# Patient Record
Sex: FEMALE | Race: WHITE | Employment: PART TIME | URBAN - METROPOLITAN AREA
[De-identification: names, ages, dates, MRNs, and addresses within clinical notes are randomized per-mention and may not be internally consistent; named-entity substitution may affect disease eponyms.]

---

## 2017-03-09 ENCOUNTER — HOSPITAL ENCOUNTER (OUTPATIENT)
Dept: GENERAL RADIOLOGY | Age: 52
Discharge: HOME | End: 2017-03-09
Attending: PHYSICAL MEDICINE & REHABILITATION
Payer: COMMERCIAL

## 2017-03-09 ENCOUNTER — HOSPITAL ENCOUNTER (OUTPATIENT)
Dept: MRI IMAGING | Age: 52
Discharge: HOME | End: 2017-03-09
Attending: PHYSICAL MEDICINE & REHABILITATION
Payer: COMMERCIAL

## 2017-03-09 DIAGNOSIS — M54.16 LUMBAR RADICULOPATHY: ICD-10-CM

## 2017-03-09 DIAGNOSIS — M54.17 LUMBOSACRAL RADICULITIS: ICD-10-CM

## 2017-03-09 DIAGNOSIS — M54.17 LUMBOSACRAL RADICULOPATHY: ICD-10-CM

## 2017-03-09 PROCEDURE — 72100 X-RAY EXAM L-S SPINE 2/3 VWS: CPT

## 2017-03-09 PROCEDURE — 72148 MRI LUMBAR SPINE W/O DYE: CPT

## 2017-03-14 ENCOUNTER — HOSPITAL ENCOUNTER (OUTPATIENT)
Dept: PHYSICAL THERAPY | Age: 52
Discharge: HOME | End: 2017-03-14
Payer: COMMERCIAL

## 2017-03-14 PROCEDURE — 97110 THERAPEUTIC EXERCISES: CPT | Performed by: PHYSICAL THERAPIST

## 2017-03-14 PROCEDURE — 97161 PT EVAL LOW COMPLEX 20 MIN: CPT | Performed by: PHYSICAL THERAPIST

## 2017-03-14 NOTE — PROGRESS NOTES
[facility]  [address]  [place], [address]  Phone: [phone]  Fax: [phone]    Plan of Care/Statement of Necessity for Physical Therapy Services  2-15    Patient name: Isaias Broderick  : 1965  Provider#: [id number]  Referral source: Liberty Pettit MD      Medical/Treatment Diagnosis: Low back pain [M54.5]     Prior Hospitalization: see medical history     Comorbidities: see evaluation  Prior Level of Function: see evaluation  Medications: Verified on Patient Summary List    Start of Care: 3/13/17      Onset Date: L sided L5/S1 laminectomy        The Plan of Care and following information is based on the information from the initial evaluation. Assessment/ key information: Pt is a 45year old female with presenting with signs and symptoms consistent with R>L lumbar radiculopathy. MRI + L5/S1 mild broad-based disc protrusion/osteophyte and minimal right foraminal stenosis at L5-S1. PMH significant for left L5/S1 laminectomy in .       Evaluation Complexity History MEDIUM  Complexity : 1-2 comorbidities / personal factors will impact the outcome/ POC ; Examination LOW Complexity : 1-2 Standardized tests and measures addressing body structure, function, activity limitation and / or participation in recreation  ;Presentation LOW Complexity : Stable, uncomplicated  ;Clinical Decision Making MEDIUM Complexity : FOTO score of 26-74  Overall Complexity Rating: LOW     Problem List: pain affecting function, decrease ROM, decrease strength, impaired gait/ balance, decrease ADL/ functional abilitiies, decrease activity tolerance, decrease flexibility/ joint mobility and decrease transfer abilities   Treatment Plan may include any combination of the following: Therapeutic exercise, Therapeutic activities, Neuromuscular re-education, Physical agent/modality, Gait/balance training, Manual therapy, Patient education, Self Care training, Functional mobility training, Home safety training and Stair training  Patient / Family readiness to learn indicated by: asking questions, trying to perform skills and interest  Persons(s) to be included in education: patient (P)  Barriers to Learning/Limitations: None  Patient Goal (s): see evaluation  Patient Self Reported Health Status: good  Rehabilitation Potential: good    Short Term Goals: To be accomplished in 2-3 weeks:  1) Pt will be independent in initial HEP  2) Pt will report 25% decrease in exacerbation of symptoms  3) Pt will report use of ice on a daily basis in order to decrease pain/inflammation. Long Term Goals: To be accomplished in 6-8 weeks:  1) Pt will increase B hip abd strength to at least 4+/5 with no more than 2/10 pain in order to perform yardwork. 2) Pt will report being able to sit for at least 45 min with no more than 2/10 pain in order to perform work duties  3) Pt will report at least 75% decrease in radicular symptoms and low back pain in order to play tennis. Frequency / Duration: Patient to be seen 1-2 times per week for 6-8 weeks. Patient/ Caregiver education and instruction: self care, activity modification and exercises    [x]  Plan of care has been reviewed with DEVIN Ventura, PT 3/14/2017 12:49 PM    ________________________________________________________________________    I certify that the above Therapy Services are being furnished while the patient is under my care. I agree with the treatment plan and certify that this therapy is necessary.     [de-identified] Signature:____________________  Date:____________Time: _________

## 2017-03-14 NOTE — PROGRESS NOTES
[facility]  [address], [place], [address]  Phone: [phone]  Fax: [phone]    PT INITIAL EVALUATION NOTE - [facility] 2-15    Patient Name: Karol Pelaez  Date:3/14/2017  : 1965  [x]  Patient  Verified   Payor: Raudel Moreno / Plan: Lamine Clark PPO / Product Type: PPO /    In time:1:00 PM  Out time:2:00 PM  Total Treatment Time (min): 60  Total Timed Codes (min): 15  1:1 Treatment Time ([facility] only): --   Visit #: 1     Treatment Area: Low back pain [M54.5]    SUBJECTIVE    Any medication changes, allergies to medications, adverse drug reactions, diagnosis change, or new procedure performed?: [] No    [x] Yes (see summary sheet for update)    Current symptoms/chief complaint:    Pt presents with LBP since prior to left sided L5/S1 laminectomy in . She had PT prior to her surgery. 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 \" She continued to have pain after surgery- \"from a bulging disc. \" She had an injection in  and also went through another round of PT. She reports pain off and on since then- \"I would just deal with the pain. It would usually go away after a day or so. But since December it has been constant. \" She reports starting to play 3 hrs of tennis on Tuesday nights since November. She states that her pain sometimes radiates into her R buttock/hip/anterior thigh. Tramadol is helpful- only takes it as needed. She sometimes gets awoken at night from the pain. MRI on 3/3/17- \"I haven't gotten the results back. \" Per CC- L5/S1- There is a mild broad-based disc protrusion/osteophyte. Facet degenerative change is minimal. Discogenic marrow degenerative changes. Schmorl's node along the inferior endplate of L5. Hemilaminotomy on the left. The canal is widely patent. The Minimal right foraminal stenosis. .    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 She used to be a nurse. Prior level of function: Pt is active- plays tennis, housework, yardwork however does it all with pain- \"I know I'll pay for it later\"    Patient goal(s): \"to become pain-free. To do my everyday activities without pain\"     Objective:      Posture:   Kyphosis: [] Increased [] Decreased   [x]  WNL  Lordosis:  [] Increased [] Decreased   [x] WNL     Gait:   Description: No significant findings    Observations:  Pt with inc'd UE chair support when transferring sit --> stand and dec'd weight shift forward    Active Movements:  ROM  AROM Comments:pain, area   Forward flexion  25% Inc'd R buttock pain   Extension  75% No change in pain   Seated Rotation right NT  -   Seated Rotation left NT  -   SB right Superior to patella Inc'd R buttock pain   SB left Superior to patella Inc'd R buttock pain (worse than SB right)     Strength:      L(0-5) R (0-5) Comments   Hip Flexion (L1,2) 4+ 4 -   Knee Extension (L3,4) 4 4 -   Knee Flexion (S1,2) 4 4+ -   Ankle Dorsiflexion (L4) 5 5 -   Sidelying hip abduction 4- 4- -   SLR 4- 4- -     Heel walking: 10 ft, no pain  Toe walking: 10 ft, no pain  Unilateral heel raise x5:  100% each LE. \"I can really feel it\" on R. Single limb stance: 10 seconds each LE. Inc'd ankle/hip strategy on L.    Prone hip extension: Pt unable to perform 1 rep on R- \"It hurts and feels so weak\"    Neurosensory:  Sensation intact    Palpation:    TTP L5 spinous process; R QL  No TTP R piriformis    Dural Mobility:  SLR Supine: [] R    [] L    [] +    [x] -    Crossed SLR  [] R    [] L    [] +    [x] -    Slump Test: [x] R    [] L    [x] +    [] -     Pain in R buttock region with slumped posture, and cranial overpressure    Stabilization Tests  ASLR Test:   [] Pos  [x] Neg   Prone Instability Test  [x] Pos  [] Neg     Flexibility Deficits:  HS tight bilaterally    Joint mobility:  Pain with sacral PA mob         Outcome Measure: FOTO not completed at this time; score to be reported at next visit.      15 min Therapeutic Exercise:  [x] See flow sheet : PPT, PPT with march, quad alt UE's, clamshells   Rationale: increase ROM and increase strength to improve the patients ability to play tennis    10 min, ice lumbar  Supine, LE's elevated          With   [x] TE   [] TA   [] neuro   [] other: Patient Education: [x] Review HEP    [] Progressed/Changed HEP based on:   [x] positioning   [x] body mechanics   [x] transfers   [x] heat/ice application    [x] other: reviewed log-roll technique; rimma/phys of spine and reason for symptoms; use of pillow under legs while sleeping; reviewed body mechanics of vacuuming/tennis/lifting; importance of ice on a daily basis      Pain Level (0-10 scale) post treatment: Not reported    Assessment:   [x] See POC  [] Other:  Plan:   [x] See POC  [] Other  [] Discharge due to:     Andrew Choi PT, DPT     3/14/2017     12:49 PM

## 2017-03-23 ENCOUNTER — HOSPITAL ENCOUNTER (OUTPATIENT)
Dept: PHYSICAL THERAPY | Age: 52
Discharge: HOME | End: 2017-03-23
Payer: COMMERCIAL

## 2017-03-23 PROCEDURE — 97110 THERAPEUTIC EXERCISES: CPT | Performed by: PHYSICAL THERAPIST

## 2017-03-23 NOTE — PROGRESS NOTES
PT DAILY TREATMENT NOTE 2-15    Patient Name: Adela Orellana  Date:3/23/2017  : 1965  [x]  Patient  Verified  Payor: Jelly oGddard / Plan: Shanice Fields PPO / Product Type: PPO /    In time:9:30 AM  Out time: 10:35 AM  Total Treatment Time (min): 65 (55 timed)  Visit #: 2     Treatment Area: Low back pain [M54.5]    SUBJECTIVE  Pain Level (0-10 scale): 0/10 \"except when I move it a certain way\"  Any medication changes, allergies to medications, adverse drug reactions, diagnosis change, or new procedure performed?: [x] No    [] Yes (see summary sheet for update)  Subjective functional status/changes:   [] No changes reported  Pt states she is getting an injection tomorrow.  She has been feeling the same since - \"although it didn't hurt as bad playing tennis on Tuesday\"    OBJECTIVE    Modality rationale: decrease inflammation and decrease pain to improve the patients ability to perform housework   Min Type Additional Details    [] Estim: []Att   []Unatt        []TENS instruct                  []IFC  []Premod   []NMES                     []Other:  []w/US   []w/ice   []w/heat  Position:  Location:    []  Traction: [] Cervical       []Lumbar                       [] Prone          []Supine                       []Intermittent   []Continuous Lbs:  [] before manual  [] after manual  []w/heat    []  Ultrasound: []Continuous   [] Pulsed at:                            []1MHz   []3MHz Location:  W/cm2:    []  Paraffin         Location:  []w/heat   10 [x]  Ice     []  Heat  []  Ice massage Position: supine, LE's elevated  Location: lumbar     []  Laser  []  Other: Position:  Location:    []  Vasopneumatic Device Pressure:       [] lo [] med [] hi   Temperature:    [x] Skin assessment post-treatment:  [x]intact []redness- no adverse reaction    []redness  adverse reaction:     55 min Therapeutic Exercise:  [x] See flow sheet :    Rationale: increase ROM, increase strength and improve coordination to improve the patients ability to play tennis      min Neuromuscular Re-education:  -  See flow sheet :   Rationale:     min Manual Therapy:     Rationale            With   [] TE   [] TA   [] neuro   [] other: Patient Education: [x] Review HEP    [] Progressed/Changed HEP based on:   [] positioning   [] body mechanics   [] transfers   [] heat/ice application    [] other:      Other Objective/Functional Measures:   FOTO= 61/100     Pain Level (0-10 scale) post treatment: 0/10    ASSESSMENT/Changes in Function:     Pt challenged with lateral stepping with tband and sidestepping with band at ankles. Pt req'd cues for maintaining pelvic tilt during marching and supine knee extension. Overall tolerated session well without exacerbation of back pain. Reviewed performing housework without performing trunk forward flexion in order to reduce radicular symptoms. Patient will continue to benefit from skilled PT services to modify and progress therapeutic interventions, address functional mobility deficits, address ROM deficits, address strength deficits, analyze and address soft tissue restrictions, analyze and cue movement patterns and analyze and modify body mechanics/ergonomics to attain remaining goals.      [x]  See Plan of Care  []  See progress note/recertification  []  See Discharge Summary         Progress towards goals / Updated goals:  nt    PLAN  []  Upgrade activities as tolerated     [x]  Continue plan of care  []  Update interventions per flow sheet       []  Discharge due to:_  []  Other:_      Samreen John PT 3/23/2017  9:39 AM

## 2017-03-27 ENCOUNTER — HOSPITAL ENCOUNTER (OUTPATIENT)
Dept: PHYSICAL THERAPY | Age: 52
Discharge: HOME | End: 2017-03-27
Payer: COMMERCIAL

## 2017-03-27 PROCEDURE — 97140 MANUAL THERAPY 1/> REGIONS: CPT | Performed by: PHYSICAL THERAPIST

## 2017-03-27 PROCEDURE — 97110 THERAPEUTIC EXERCISES: CPT | Performed by: PHYSICAL THERAPIST

## 2017-03-27 NOTE — PROGRESS NOTES
PT DAILY TREATMENT NOTE 2-15    Patient Name: Brenna Gay  Date:3/27/2017  : 1965  [x]  Patient  Verified  Payor: Nas Anderson / Plan: Adria Crane PPO / Product Type: PPO /    In time: 10:30 AM  Out time: 11:40 AM  Total Treatment Time (min): 70 (60 timed)  Visit #: 3    Treatment Area: Low back pain [M54.5]    SUBJECTIVE  Pain Level (0-10 scale): 0/10  Any medication changes, allergies to medications, adverse drug reactions, diagnosis change, or new procedure performed?: [x] No    [] Yes (see summary sheet for update)  Subjective functional status/changes:   [] No changes reported  Pt reports she feels better after the injection- \"I'm still feeling it a little bit\"    OBJECTIVE    Modality rationale: decrease inflammation and decrease pain to improve the patients ability to perform housework   Min Type Additional Details    [] Estim: []Att   []Unatt        []TENS instruct                  []IFC  []Premod   []NMES                     []Other:  []w/US   []w/ice   []w/heat  Position:  Location:    []  Traction: [] Cervical       []Lumbar                       [] Prone          []Supine                       []Intermittent   []Continuous Lbs:  [] before manual  [] after manual  []w/heat    []  Ultrasound: []Continuous   [] Pulsed at:                            []1MHz   []3MHz Location:  W/cm2:    []  Paraffin         Location:  []w/heat   10 [x]  Ice     []  Heat  []  Ice massage Position: supine, LE's elevated  Location: lumbar     []  Laser  []  Other: Position:  Location:    []  Vasopneumatic Device Pressure:       [] lo [] med [] hi   Temperature:    [x] Skin assessment post-treatment:  [x]intact []redness- no adverse reaction    []redness  adverse reaction:     50 min Therapeutic Exercise:  [x] See flow sheet :    Rationale: increase ROM, increase strength and improve coordination to improve the patients ability to play tennis      min Neuromuscular Re-education:  -  See flow sheet :   Rationale:    10 min Manual Therapy: STM/TPR R piriformis, QL     Rationale: increase tissue extensibility, decrease pain to improve pt's ability to perform ADL's            With   [] TE   [] TA   [] neuro   [] other: Patient Education: [x] Review HEP    [] Progressed/Changed HEP based on:   [] positioning   [] body mechanics   [] transfers   [] heat/ice application    [] other:      Other Objective/Functional Measures: Mod to severe TTP R piriformis, QL, R ITB  ASIS symmetrical in supine     Pain Level (0-10 scale) post treatment: 0/10    ASSESSMENT/Changes in Function:     Muscle sequencing/activation corrected for PPT. Pt reported slight relief of \"tightness\" after ITB foam roll. Educated pt on continued use of ice at home due to manual today. Updated HEP to include piriformis stretch, lateral tband walkouts, sidestepping with tband. Patient will continue to benefit from skilled PT services to modify and progress therapeutic interventions, address functional mobility deficits, address ROM deficits, address strength deficits, analyze and address soft tissue restrictions, analyze and cue movement patterns and analyze and modify body mechanics/ergonomics to attain remaining goals.      [x]  See Plan of Care  []  See progress note/recertification  []  See Discharge Summary         Progress towards goals / Updated goals:  nt    PLAN  []  Upgrade activities as tolerated     [x]  Continue plan of care  []  Update interventions per flow sheet       []  Discharge due to:_  []  Other:_      Samreen John PT 3/27/2017  10:35 AM

## 2017-03-29 ENCOUNTER — APPOINTMENT (OUTPATIENT)
Dept: PHYSICAL THERAPY | Age: 52
End: 2017-03-29
Payer: COMMERCIAL

## 2017-03-30 ENCOUNTER — APPOINTMENT (OUTPATIENT)
Dept: PHYSICAL THERAPY | Age: 52
End: 2017-03-30
Payer: COMMERCIAL

## 2017-04-03 ENCOUNTER — HOSPITAL ENCOUNTER (OUTPATIENT)
Dept: PHYSICAL THERAPY | Age: 52
Discharge: HOME | End: 2017-04-03
Payer: COMMERCIAL

## 2017-04-03 PROCEDURE — 97110 THERAPEUTIC EXERCISES: CPT | Performed by: PHYSICAL THERAPIST

## 2017-04-03 PROCEDURE — 97014 ELECTRIC STIMULATION THERAPY: CPT | Performed by: PHYSICAL THERAPIST

## 2017-04-03 PROCEDURE — 97140 MANUAL THERAPY 1/> REGIONS: CPT | Performed by: PHYSICAL THERAPIST

## 2017-04-03 NOTE — PROGRESS NOTES
PT DAILY TREATMENT NOTE 2-15    Patient Name: Scottie Xavier  Date:4/3/2017  : 1965  [x]  Patient  Verified  Payor: Joseph Crawford / Plan: Bear Hernandez PPO / Product Type: PPO /    In time: 9:30 AM  Out time: 10:40 AM  Total Treatment Time (min): 70 (60 timed)  Visit #: 4    Treatment Area: Low back pain [M54.5]    SUBJECTIVE  Pain Level (0-10 scale): 2-3/10  Any medication changes, allergies to medications, adverse drug reactions, diagnosis change, or new procedure performed?: [x] No    [] Yes (see summary sheet for update)  Subjective functional status/changes:   [] No changes reported  Pt reports she visited her mother in [place] this weekend- 6 hours in car on Friday and 6 hours on . She also did 50 squats on Friday night. She reports she has inc'd hip, and central LBP since last visit. She also reports pain in R elbow and shoulder while riding in the car.  \"I was in a lot of pain when I woke up on Saturday morning\"   She is getting a second injection on Friday this week    OBJECTIVE    Modality rationale: decrease inflammation and decrease pain to improve the patients ability to perform housework   Min Type Additional Details   10 [x] Estim: []Att   []Unatt        []TENS instruct                  [x]IFC  []Premod   []NMES                     []Other:  []w/US   [x]w/ice   []w/heat  Position: R lumbar  Location:    []  Traction: [] Cervical       []Lumbar                       [] Prone          []Supine                       []Intermittent   []Continuous Lbs:  [] before manual  [] after manual  []w/heat    []  Ultrasound: []Continuous   [] Pulsed at:                            []1MHz   []3MHz Location:  W/cm2:    []  Paraffin         Location:  []w/heat    [x]  Ice     []  Heat  []  Ice massage Position: supine, LE's elevated  Location: lumbar     []  Laser  []  Other: Position:  Location:    []  Vasopneumatic Device Pressure:       [] lo [] med [] hi   Temperature:    [x] Skin assessment post-treatment:  [x]intact []redness- no adverse reaction    []redness  adverse reaction:     50 min Therapeutic Exercise:  [x] See flow sheet :    Rationale: increase ROM, increase strength and improve coordination to improve the patients ability to play tennis      min Neuromuscular Re-education:  -  See flow sheet :   Rationale:    10 min Manual Therapy: STM/TPR R piriformis, sacral ligaments, QL   MET to correct R anteriorly rotated ilium    Rationale: increase tissue extensibility, decrease pain to improve pt's ability to perform ADL's            With   [] TE   [] TA   [] neuro   [] other: Patient Education: [x] Review HEP    [] Progressed/Changed HEP based on:   [] positioning   [] body mechanics   [] transfers   [] heat/ice application    [] other:      Other Objective/Functional Measures:   R ASIS anteriorly rotated compared to L in supine   B squat x3- quad dominant squat; anterior weight shift    Pain Level (0-10 scale) post treatment: 0/10 \"numb\"    ASSESSMENT/Changes in Function:     Held on sidestepping with tband, bridges, and foam rolling today due to increased symptoms. Educated pt on proper form during squats and with use of UE support for activation of glutes vs quads. Performed MET to correct anteriorly rotated R ilium. Patient will continue to benefit from skilled PT services to modify and progress therapeutic interventions, address functional mobility deficits, address ROM deficits, address strength deficits, analyze and address soft tissue restrictions, analyze and cue movement patterns and analyze and modify body mechanics/ergonomics to attain remaining goals.      [x]  See Plan of Care  []  See progress note/recertification  []  See Discharge Summary         Progress towards goals / Updated goals:  nt    PLAN  []  Upgrade activities as tolerated     [x]  Continue plan of care  []  Update interventions per flow sheet       []  Discharge due to:_  []  Other:_      Tavia Going, PT 4/3/2017  9:28 AM

## 2017-04-17 ENCOUNTER — APPOINTMENT (OUTPATIENT)
Dept: PHYSICAL THERAPY | Age: 52
End: 2017-04-17
Payer: COMMERCIAL

## 2017-04-21 ENCOUNTER — HOSPITAL ENCOUNTER (OUTPATIENT)
Dept: PHYSICAL THERAPY | Age: 52
Discharge: HOME | End: 2017-04-21
Payer: COMMERCIAL

## 2017-04-21 PROCEDURE — 97110 THERAPEUTIC EXERCISES: CPT | Performed by: PHYSICAL THERAPIST

## 2017-04-21 PROCEDURE — 97140 MANUAL THERAPY 1/> REGIONS: CPT | Performed by: PHYSICAL THERAPIST

## 2017-04-21 NOTE — PROGRESS NOTES
PT DAILY TREATMENT NOTE 2-15    Patient Name: Anais Vincent  Date:2017  : 1965  [x]  Patient  Verified  Payor: Cameron Rosas / Plan: Adolph Goodell PPO / Product Type: PPO /    In time: 9:00 AM  Out time: 10:25 AM  Total Treatment Time (min): 85 (70 timed)  Visit #: 5    Treatment Area: Low back pain [M54.5]    SUBJECTIVE  Pain Level (0-10 scale): 1/10  Any medication changes, allergies to medications, adverse drug reactions, diagnosis change, or new procedure performed?: [x] No    [] Yes (see summary sheet for update)  Subjective functional status/changes:   [] No changes reported  Pt states \"I had the second injection two weeks ago. I think between that and the therapy its really been helping. \" She had to go back up to [place] last week- her mother is not doing well.      OBJECTIVE    Modality rationale: decrease inflammation and decrease pain to improve the patients ability to perform housework   Min Type Additional Details    [] Estim: []Att   []Unatt        []TENS instruct                  [x]IFC  []Premod   []NMES                     []Other:  []w/US   [x]w/ice   []w/heat  Position: R lumbar  Location:    []  Traction: [] Cervical       []Lumbar                       [] Prone          []Supine                       []Intermittent   []Continuous Lbs:  [] before manual  [] after manual  []w/heat    []  Ultrasound: []Continuous   [] Pulsed at:                            []1MHz   []3MHz Location:  W/cm2:    []  Paraffin         Location:  []w/heat   10 [x]  Ice     []  Heat  []  Ice massage Position: supine, LE's elevated  Location: lumbar     []  Laser  []  Other: Position:  Location:    []  Vasopneumatic Device Pressure:       [] lo [] med [] hi   Temperature:    [x] Skin assessment post-treatment:  [x]intact []redness- no adverse reaction    []redness  adverse reaction:     60 min Therapeutic Exercise:  [x] See flow sheet :    Rationale: increase ROM, increase strength and improve coordination to improve the patients ability to play tennis      min Neuromuscular Re-education:  -  See flow sheet :   Rationale:    15 min Manual Therapy: STM/TPR R piriformis, ITB   Rationale: increase tissue extensibility, decrease pain to improve pt's ability to perform ADL's            With   [] TE   [] TA   [] neuro   [] other: Patient Education: [x] Review HEP    [] Progressed/Changed HEP based on:   [] positioning   [] body mechanics   [] transfers   [] heat/ice application    [] other:      Other Objective/Functional Measures:   Severe TTP over R ITB    Pain Level (0-10 scale) post treatment: 0/10    ASSESSMENT/Changes in Function:     Added supine ITB stretch with strap due to severe TTP over area- advised to ice region this evening due to possible soreness after manual today. Overall tolerated therapy session well. Pt progressing with strengthening and proprioceptive training    Patient will continue to benefit from skilled PT services to modify and progress therapeutic interventions, address functional mobility deficits, address ROM deficits, address strength deficits, analyze and address soft tissue restrictions, analyze and cue movement patterns and analyze and modify body mechanics/ergonomics to attain remaining goals.      [x]  See Plan of Care  []  See progress note/recertification  []  See Discharge Summary         Progress towards goals / Updated goals:  nt    PLAN  []  Upgrade activities as tolerated     [x]  Continue plan of care  []  Update interventions per flow sheet       []  Discharge due to:_  []  Other:_      Con Ortez, PT 4/21/2017  9:23 AM

## 2017-04-24 ENCOUNTER — APPOINTMENT (OUTPATIENT)
Dept: PHYSICAL THERAPY | Age: 52
End: 2017-04-24
Payer: COMMERCIAL

## 2017-04-26 ENCOUNTER — HOSPITAL ENCOUNTER (OUTPATIENT)
Dept: PHYSICAL THERAPY | Age: 52
Discharge: HOME | End: 2017-04-26
Payer: COMMERCIAL

## 2017-04-26 PROCEDURE — 97110 THERAPEUTIC EXERCISES: CPT | Performed by: PHYSICAL THERAPIST

## 2017-04-26 PROCEDURE — 97140 MANUAL THERAPY 1/> REGIONS: CPT | Performed by: PHYSICAL THERAPIST

## 2017-04-26 NOTE — PROGRESS NOTES
PT DAILY TREATMENT NOTE 2-15    Patient Name: Mallorie Alvarez  Date:2017  : 1965  [x]  Patient  Verified  Payor: Ashanti Ours / Plan: Emily Quiroz PPO / Product Type: PPO /    In time: 12:30 PM  Out time: 1:30 PM  Total Treatment Time (min): 60 (60 timed)  Visit #: 6    Treatment Area: Low back pain [M54.5]    SUBJECTIVE  Pain Level (0-10 scale): \"sore\"  Any medication changes, allergies to medications, adverse drug reactions, diagnosis change, or new procedure performed?: [x] No    [] Yes (see summary sheet for update)  Subjective functional status/changes:   [] No changes reported  Pt states \"soreness\" today, she had some pain down the outside of her leg after last visit.  She has to leave by 1:30 today for work    OBJECTIVE    Modality rationale: decrease inflammation and decrease pain to improve the patients ability to perform housework   Min Type Additional Details    [] Estim: []Att   []Unatt        []TENS instruct                  [x]IFC  []Premod   []NMES                     []Other:  []w/US   [x]w/ice   []w/heat  Position: R lumbar  Location:    []  Traction: [] Cervical       []Lumbar                       [] Prone          []Supine                       []Intermittent   []Continuous Lbs:  [] before manual  [] after manual  []w/heat    []  Ultrasound: []Continuous   [] Pulsed at:                            []1MHz   []3MHz Location:  W/cm2:    []  Paraffin         Location:  []w/heat   At home [x]  Ice     []  Heat  []  Ice massage Position: supine, LE's elevated  Location: lumbar     []  Laser  []  Other: Position:  Location:    []  Vasopneumatic Device Pressure:       [] lo [] med [] hi   Temperature:    [x] Skin assessment post-treatment:  [x]intact []redness- no adverse reaction    []redness  adverse reaction:     45 min Therapeutic Exercise:  [x] See flow sheet :    Rationale: increase ROM, increase strength and improve coordination to improve the patients ability to play tennis      min Neuromuscular Re-education:  -  See flow sheet :   Rationale:    15 min Manual Therapy: STM/TPR R piriformis, ITB   Rationale: increase tissue extensibility, decrease pain to improve pt's ability to perform ADL's            With   [] TE   [] TA   [] neuro   [] other: Patient Education: [x] Review HEP    [] Progressed/Changed HEP based on:   [] positioning   [] body mechanics   [] transfers   [] heat/ice application    [] other:      Other Objective/Functional Measures:   Severe TTP over R ITB  ASIS, medial malleoli symmetrical in supine    Pain Level (0-10 scale) post treatment: 0/10    ASSESSMENT/Changes in Function:     Encouraged pt to continue stretching program and using ice. She tolerated therapy session well, progressed several core strengthening exercises. Patient will continue to benefit from skilled PT services to modify and progress therapeutic interventions, address functional mobility deficits, address ROM deficits, address strength deficits, analyze and address soft tissue restrictions, analyze and cue movement patterns and analyze and modify body mechanics/ergonomics to attain remaining goals.      [x]  See Plan of Care  []  See progress note/recertification  []  See Discharge Summary         Progress towards goals / Updated goals:  nt    PLAN  []  Upgrade activities as tolerated     [x]  Continue plan of care  []  Update interventions per flow sheet       []  Discharge due to:_  []  Other:_      Kailey Enriquez, PT 4/26/2017  12:40 PM

## 2017-05-02 ENCOUNTER — APPOINTMENT (OUTPATIENT)
Dept: PHYSICAL THERAPY | Age: 52
End: 2017-05-02

## 2018-03-01 ENCOUNTER — HOSPITAL ENCOUNTER (OUTPATIENT)
Dept: GENERAL RADIOLOGY | Age: 53
Discharge: HOME | End: 2018-03-01
Payer: COMMERCIAL

## 2018-03-01 DIAGNOSIS — M25.50 JOINT PAIN: ICD-10-CM

## 2018-03-01 PROCEDURE — 73130 X-RAY EXAM OF HAND: CPT

## 2018-03-01 PROCEDURE — 73630 X-RAY EXAM OF FOOT: CPT

## 2018-03-01 PROCEDURE — 73610 X-RAY EXAM OF ANKLE: CPT

## 2018-05-25 ENCOUNTER — OFFICE VISIT (OUTPATIENT)
Dept: INTERNAL MEDICINE CLINIC | Age: 53
End: 2018-05-25

## 2018-05-25 VITALS
BODY MASS INDEX: 24.14 KG/M2 | RESPIRATION RATE: 15 BRPM | SYSTOLIC BLOOD PRESSURE: 110 MMHG | WEIGHT: 144.9 LBS | DIASTOLIC BLOOD PRESSURE: 70 MMHG | HEART RATE: 80 BPM | HEIGHT: 65 IN | TEMPERATURE: 98.3 F | OXYGEN SATURATION: 98 %

## 2018-05-25 DIAGNOSIS — M62.830 BACK SPASM: ICD-10-CM

## 2018-05-25 DIAGNOSIS — M35.9 CONNECTIVE TISSUE DISEASE (HCC): Primary | ICD-10-CM

## 2018-05-25 RX ORDER — BISMUTH SUBSALICYLATE 262 MG
1 TABLET,CHEWABLE ORAL DAILY
COMMUNITY

## 2018-05-25 RX ORDER — CHOLECALCIFEROL (VITAMIN D3) 125 MCG
CAPSULE ORAL
COMMUNITY

## 2018-05-25 RX ORDER — DICLOFENAC SODIUM 75 MG/1
TABLET, DELAYED RELEASE ORAL
Refills: 0 | COMMUNITY
Start: 2018-04-19 | End: 2018-11-20

## 2018-05-25 NOTE — PROGRESS NOTES
Establish Care (New patient, here to establish care.)       HPI:  Satish Galvin is a 47y.o. year old female who is here to establish care. She  had her medical care:    Dr. Bucky Donald at [facility]    She reports the following history and medical concerns: Mother passed away from lung cancer.  (smoker)  Brother diagnosed with pancreatic cancer. 2010- Raynaud's - BEBO positive. \"some type of CTD\"  2 years ago developed joint pain. Mixed CTD. Dr. Nick Figueredo at [facility]. Increased LFT- fatty liver on ultrasound 2016  Last blood test a month ago. Did a tox screen for tramadol with rheumatologist.  Tramadol helps with her joint pain but she wants to come off it. Assessment and Plan        1. Connective tissue disease ([address]  Will need to get old records. She states she never had an elevated CRP. Her joint pain is mostly in am and there is no swelling. Her back issues may be related to problem #2 and she may not need the plaquenil. She would like to see Dr. Ollie Cheney again. MRI reviewed that showed some disc disease that a [facility] doctor wanted to do surgery- but she saw someone at [facility] that felt this can be managed without surgery. xrays of hands, and ankles show no abnormalities. .  xr spine negative.    - REFERRAL TO RHEUMATOLOGY    2. Back spasm  Spasm palpated on right lower back. Tender to touch.   TIME OUT performed immediately prior to start of procedure:   James Velazquez MD, have performed the following reviews on Satish Galvin   prior to the start of the procedure:     * Patient was identified by name and date of birth   * Agreement on procedure being performed was verified   * Risks and Benefits explained to the patient   * Procedure site verified and marked as necessary   * Patient was positioned for comfort   * Consent was given by patient    Date of procedure: 5/25/2018  Procedure performed by:Finn Simmons MD   Patient assisted by: self   How tolerated by patient: tolerated the procedure well with no complications   Comments: none         Patient explained the risks and benefits of acupuncture to help with the acute problem. There can be some soreness afterwards and the effect can be immediate to slightly delayed (2-3 days). If the problem persists or gets worse, please call back or send a my chart message. If you experience shortness of breath, please let me know immediately. Patient agreed to proceed with treatment. Seirin packaged needle used No. 5 (0.25) 30 mm  QTY 1  Points palpated and inserted 5-10 mm at marked points  Patient tolerated procedure and felt unknown relief as she was not feeling pain now. Use tiger balm to area  Stretches  Warm epsom salt baths    Turmeric to help with joint pain. Anti-inflammatory foods. Probiotic to change microbiome. Visit Vitals    /70 (BP 1 Location: Left arm, BP Patient Position: Sitting)    Pulse 80    Temp 98.3 °F (36.8 °C) (Oral)    Resp 15    Ht 5' 5\" (1.651 m)    Wt 144 lb 14.4 oz (65.7 kg)    SpO2 98%    BMI 24.11 kg/m2       Historical Data    Past Medical History:   Diagnosis Date    Auto[facility] disease ([facility]     Esophageal stricture     Menopause        Past Surgical History:   Procedure Laterality Date    HX APPENDECTOMY      HX HYSTEROSCOPY WITH ENDOMETRIAL ABLATION         Outpatient Encounter Prescriptions as of 5/25/2018   Medication Sig Dispense Refill    diclofenac EC (VOLTAREN) 75 mg EC tablet TAKE 1 TABLET WITH FOOD OR MILK TWICE A DAY AS NEEDED ORALLY 30 DAY(S)  0    multivitamin (ONE A DAY) tablet Take 1 Tab by mouth daily.  ferrous fumarate/vit Bcomp,C (SUPER B COMPLEX PO) Take  by mouth.  melatonin tab tablet Take  by mouth nightly.  hydroxychloroquine (PLAQUENIL) 200 mg tablet Take 200 mg by mouth two (2) times a day.  fexofenadine (ALLEGRA) 180 mg tablet Take 180 mg by mouth daily as needed.  Indications: SEASONAL ALLERGIC RHINITIS      predniSONE (Graydon Pollo) 10 mg tablet Prednisone 10mg tabs:  6 tabs daily for 2 days then drop to   4 tabs daily for 2 days then drop to   2 tabs daily for 2 days then drop to   1 tab daily for 2 days then stop. Dispense 26 tabs 26 Tab 0    traMADol (ULTRAM) 50 mg tablet Take 50 mg by mouth every six (6) hours as needed for Pain.  estradiol (VAGIFEM) 10 mcg tab vaginal tablet Insert 10 mcg into vagina every Monday, Wednesday, Friday.  estradiol (ESTRACE) 0.01 % (0.1 mg/gram) vaginal cream Insert 2 g into vagina every Monday, Wednesday, Friday. No facility-administered encounter medications on file as of 5/25/2018. Allergies   Allergen Reactions    Sulindac Hives, Shortness of Breath, Palpitations and Other (comments)    Talwin [Pentazocine Lactate] Hives        Social History     Social History    Marital status:      Spouse name: N/A    Number of children: N/A    Years of education: N/A     Occupational History    Not on file. Social History Main Topics    Smoking status: Never Smoker    Smokeless tobacco: Never Used    Alcohol use No    Drug use: No    Sexual activity: Not on file     Other Topics Concern    Not on file     Social History Narrative        family history includes Lung Cancer in her mother; Pancreatic Cancer in her brother. Review of Systems   Constitutional: Negative for weight loss. Eyes: Negative for blurred vision. Respiratory: Negative for shortness of breath. Cardiovascular: Negative for chest pain. Gastrointestinal: Negative for abdominal pain. Genitourinary: Negative for dysuria and frequency. Musculoskeletal: Positive for back pain and joint pain. Negative for myalgias and neck pain. Skin: Negative for rash. Neurological: Negative for dizziness, focal weakness, weakness and headaches. Endo/Heme/Allergies: Negative for environmental allergies. Does not bruise/bleed easily.          Physical Exam   Constitutional: She is oriented to person, place, and time. She appears well-nourished. No distress. Neck: Carotid bruit is not present. No thyromegaly present. Cardiovascular: Normal rate, regular rhythm and normal heart sounds. Pulmonary/Chest: Effort normal and breath sounds normal. No respiratory distress. She has no wheezes. Abdominal: Soft. Bowel sounds are normal. She exhibits no mass. There is no tenderness. Musculoskeletal: She exhibits no edema. Lumbar back: She exhibits tenderness, pain and spasm. She exhibits no bony tenderness, no edema and normal pulse. Back:    Lymphadenopathy:     She has no cervical adenopathy. Neurological: She is alert and oriented to person, place, and time. Skin: Skin is warm and dry. No rash noted. No erythema. Psychiatric: She has a normal mood and affect. Thought content normal.   Nursing note and vitals reviewed. Ortho Exam       Orders Placed This Encounter    diclofenac EC (VOLTAREN) 75 mg EC tablet     Sig: TAKE 1 TABLET WITH FOOD OR MILK TWICE A DAY AS NEEDED ORALLY 30 DAY(S)     Refill:  0    multivitamin (ONE A DAY) tablet     Sig: Take 1 Tab by mouth daily.  ferrous fumarate/vit Bcomp,C (SUPER B COMPLEX PO)     Sig: Take  by mouth.  melatonin tab tablet     Sig: Take  by mouth nightly. I have reviewed the patient's medical history in detail and updated the computerized patient record. We had a prolonged discussion about these complex clinical issues and went over the various important aspects to consider. All questions were answered. Advised her to call back or return to office if symptoms do not improve, change in nature, or persist.    She was given an after visit summary or informed of [email or web address] which includes patient instructions, diagnoses, current medications, & vitals. She expressed understanding with the diagnosis and plan.

## 2018-05-25 NOTE — MR AVS SNAPSHOT
[address] [facility], [address] [place] [address] 
[phone] Patient: Francesco Vaughan MRN: [id number] [id number] Visit Information Date & Time Provider Department Dept. Phone Encounter #  
 5/25/2018 11:15 AM Jasbir Kwon MD [place] [id number] [phone] Upcoming Health Maintenance Date Due Hepatitis C Screening 1965 DTaP/Tdap/Td series (1 - Tdap) 6/3/1985 PAP AKA CERVICAL CYTOLOGY 6/3/1985 BREAST CANCER SCRN MAMMOGRAM 6/3/2014 FOBT Q 1 YEAR AGE 50-75 6/3/2014 Influenza Age 5 to Adult 8/1/2018 Allergies as of 5/25/2018  Review Complete On: 5/25/2018 By: Jasbir Kwon MD  
  
 Severity Noted Reaction Type Reactions Sulindac High 03/26/2015   Systemic Hives, Shortness of Breath, Palpitations, Other (comments) Talwin [Pentazocine Lactate]  03/25/2015    Hives Current Immunizations  Never Reviewed Name Date Influenza Vaccine PF 3/26/2015  9:22 AM  
 Pneumococcal Polysaccharide (PPSV-23) 3/26/2015  9:24 AM  
  
 Not reviewed this visit You Were Diagnosed With   
  
 Codes Comments Connective tissue disease ([facility]    -  Primary ICD-10-CM: M35.9 ICD-9-CM: 710.9 Vitals BP Pulse Temp Resp Height(growth percentile) Weight(growth percentile) 110/70 (BP 1 Location: Left arm, BP Patient Position: Sitting) 80 98.3 °F (36.8 °C) (Oral) 15 5' 5\" (1.651 m) 144 lb 14.4 oz (65.7 kg) SpO2 BMI OB Status Smoking Status 98% 24.11 kg/m2 Menopause Never Smoker Vitals History BMI and BSA Data Body Mass Index Body Surface Area  
 24.11 kg/m 2 1.74 m 2 Preferred Pharmacy Pharmacy Name Phone [id number] [phone] Your Updated Medication List  
  
   
This list is accurate as of 5/25/18 12:15 PM.  Always use your most recent med list. ALLEGRA 180 mg tablet Generic drug:  fexofenadine Take 180 mg by mouth daily as needed. Indications: SEASONAL ALLERGIC RHINITIS  
  
 diclofenac EC 75 mg EC tablet Commonly known as:  VOLTAREN  
TAKE 1 TABLET WITH FOOD OR MILK TWICE A DAY AS NEEDED ORALLY 30 DAY(S)  
  
 hydroxychloroquine 200 mg tablet Commonly known as:  PLAQUENIL Take 200 mg by mouth two (2) times a day. melatonin Tab tablet Take  by mouth nightly. multivitamin tablet Commonly known as:  ONE A DAY Take 1 Tab by mouth daily. traMADol 50 mg tablet Commonly known as:  ULTRAM  
Take 50 mg by mouth every six (6) hours as needed for Pain. We Performed the Following REFERRAL TO RHEUMATOLOGY [IIS21 Custom] Comments:  
 Elizabeth [facility] Adriana Almeida Referral Information Referral ID Referred By Referred To  
  
 [id number] Bertha Johnson MD   
   [address] [place] [address] Phone: [phone] Fax: [phone] Visits Status Start Date End Date 1 New Request 5/25/18 5/25/19 If your referral has a status of pending review or denied, additional information will be sent to support the outcome of this decision. Patient Instructions Turmeric [facility] HEALTHY SWEETS How much: Sparingly Healthy choices: Unsweetened dried fruit, dark chocolate, fruit sorbet Why: Dark chocolate provides polyphenols with antioxidant activity. Choose dark chocolate with at least 70 percent pure cocoa and have an ounce a few times a week. Fruit sorbet is a better option than other frozen desserts. RED WINE How much: Optional, no more than 1-2 glasses per day Healthy choices: Organic red wine Why: Red wine has beneficial antioxidant activity. Limit intake to no more than 1-2 servings per day. If you do not drink alcohol, do not start. SUPPLEMENTS How much: Daily Healthy choices: High quality multivitamin/multimineral that includes key antioxidants (vitamin C, vitamin E, mixed carotenoids, and selenium); co-enzyme Q10; 2-3 grams of a molecularly distilled fish oil; 2,000 IU of vitamin D3 Why: Supplements help fill any gaps in your diet when you are unable to get your daily requirement of micronutrients. Click here to learn more about supplements and get your free recommendation. TEA How much: 2-4 cups per day Healthy choices: White, green, oolong teas Why: Tea is rich in catechins, antioxidant compounds that reduce inflammation. Purchase high-quality tea and learn how to correctly brew it for maximum taste and health benefits. [facility] [address] How much: Unlimited amounts Healthy choices: Turmeric, dunham powder (which contains turmeric), sweta and garlic (dried and fresh), chili peppers, basil, cinnamon, rosemary, thyme Why: Use these herbs and spices generously to season foods. Turmeric and sweta are powerful, natural anti-inflammatory agents. OTHER SOURCES OF PROTEIN How much: 1-2 servings a week (one portion is equal to 1 ounce of cheese, 1 eight-ounce serving of dairy, 1 egg, 3 ounces cooked poultry or skinless meat) Healthy choices: High quality natural cheese and yogurt, omega-3 enriched eggs, skinless poultry, grass-fed lean meats Why: In general, try to reduce consumption of animal foods. If you eat chicken, choose organic, cage-free chicken and remove the skin and associated fat. Use organic, reduced-fat dairy products moderately, especially yogurt and natural cheeses such as [facility] (Swiss), [place] and true ROSS. If you eat eggs, choose omega-3 enriched eggs (made by feeding hens a flax-meal-enriched diet), or organic eggs from free-range chickens. [facility] How much: Unlimited amounts Healthy choices: Shiitake, enokidake, maitake, oyster mushrooms (and wild mushrooms if available) Why: These mushrooms contain compounds that enhance immune function.  Never eat mushrooms raw, and minimize consumption of common commercial button mushrooms (including crimini and portobello). WHOLE SOY FOODS How much: 1-2 servings per day (one serving is equal to ½ cup tofu or tempeh, 1 cup soymilk, ½ cup cooked edamame, 1 ounce of soynuts) Healthy choices: Tofu, tempeh, edamame, soy nuts, soymilk Why: Soy foods contain isoflavones that have antioxidant activity and are protective against cancer. Choose whole soy foods over fractionated foods like isolated soy protein powders and imitation meats made with soy isolate. FISH & SEAFOOD How much:  2-6 servings per week (one serving is equal to 4 ounces of fish or seafood) Healthy choices: Wild [place] salmon (especially sockeye), herring, sardines, and black cod (sablefish) Why: These fish are rich in omega-3 fats, which are strongly anti-inflammatory. If you choose not to eat fish, take a molecularly distilled fish oil supplement that provides both EPA and DHA in a dose of 2-3 grams per day. HEALTHY FATS How much:  5-7 servings per day (one serving is equal to 1 teaspoon of oil, 2 walnuts, 1 tablespoon of flaxseed, 1 ounce of avocado) Healthy choices: For cooking, use extra virgin olive oil and expeller-pressed organic canola oil. Other sources of healthy fats include nuts (especially walnuts), avocados, and seeds - including hemp seeds and freshly ground flaxseed. Omega-3 fats are also found in cold water fish, omega-3 enriched eggs, and whole soy foods. Organic, expeller pressed, high-oleic sunflower or safflower oils may also be used, as well as walnut and hazelnut oils in salads and dark roasted sesame oil as a flavoring for soups and stir-fries Why: Healthy fats are those rich in either monounsaturated or omega-3 fats. Extra-virgin olive oil is rich in polyphenols with antioxidant activity and canola oil contains a small fraction of omega-3 fatty acids. WHOLE & CRACKED GRAINS How much:  3-5 servings a day (one serving is equal to about ½ cup cooked grains) Healthy choices: Brown rice, basmati rice, wild rice, buckwheat, groats, barley, quinoa, steel-cut oats Why: Whole grains digest slowly, reducing frequency of spikes in blood sugar that promote inflammation. \"Whole grains\" means grains that are intact or in a few large pieces, not whole wheat bread or other products made from flour. PASTA (al dente) How much: 2-3 servings per week (one serving is equal to about ½ cup cooked pasta) Healthy choices: Organic pasta, rice noodles, bean thread noodles, and part whole wheat and buckwheat noodles like [place] udon and soba Why: Pasta cooked al dente (when it has \"tooth\" to it) has a lower glycemic index than fully-cooked pasta. Low-glycemic-load carbohydrates should be the bulk of your carbohydrate intake to help minimize spikes in blood glucose levels. BEANS & LEGUMES How much: 1-2 servings per day (one serving is equal to ½ cup cooked beans or legumes) Healthy choices: Beans like Anasazi, adzuki and black, as well as chickpeas, black-eyed peas and lentils Why: Beans are rich in folic acid, magnesium, potassium and soluble fiber. They are a low-glycemic-load food. Eat them well-cooked either whole or pureed into spreads like hummus. VEGETABLES How much: 4-5 servings per day minimum (one serving is equal to 2 cups salad greens, ½ cup vegetables cooked, raw or juiced) Healthy Choices: Lightly cooked dark leafy greens (spinach, henry greens, kale, Swiss chard), cruciferous vegetables (broccoli, cabbage, [place] sprouts, kale, bok rickey and cauliflower), carrots, beets, onions, peas, squashes, sea vegetables and washed raw salad greens Why: Vegetables are rich in flavonoids and carotenoids with both antioxidant and anti-inflammatory activity. Go for a wide range of colors, eat them both raw and cooked, and choose organic when possible. FRUITS How much:  3-4 servings per day (one serving is equal to 1 medium size piece of fruit, ½ cup chopped fruit, ¼ cup of dried fruit) Healthy choices: Raspberries, blueberries, strawberries, peaches, nectarines, oranges, pink grapefruit, red grapes, plums, pomegranates, blackberries, cherries, apples, and pears - all lower in glycemic load than most tropical fruits Why: Fruits are rich in flavonoids and carotenoids with both antioxidant and anti-inflammatory activity. Go for a wide range of colors, choose fruit that is fresh in season or frozen, and buy organic when possible. Additional Item: 
WATER How much: Throughout the day Healthy choices: Drink pure water, or drinks that are mostly water (tea, very diluted fruit juice, sparkling water with lemon) throughout the day. Why: Water is vital for overall functioning of the body. Microbiome. - gut bacteria Dr. Tyrone Ley [employer] probiotics for Women Stop dairy and wheat for 2 weeks at least. 
 
 
 
 
 
 
 
 
 
 
 
 
  
Introducing [facility]! Dear Delores Antunez: Thank you for requesting a [employer] account. Our records indicate that you already have an active [employer] account. You can access your account anytime at [email or web address] Did you know that you can access your hospital and ER discharge instructions at any time in [employer]? You can also review all of your test results from your hospital stay or ER visit. Additional Information If you have questions, please visit the Frequently Asked Questions section of the [employer] website at [email or web address] Remember, [employer] is NOT to be used for urgent needs. For medical emergencies, dial 911. Now available from your iPhone and Android! Please provide this summary of care documentation to your next provider. Your primary care clinician is listed as Kamla Velazquez  If you have any questions after today's visit, please call [phone].

## 2018-05-25 NOTE — PATIENT INSTRUCTIONS
Turmeric    Araceli Manual Milk          HEALTHY SWEETS  How much: Sparingly  Healthy choices: Unsweetened dried fruit, dark chocolate, fruit sorbet  Why: Dark chocolate provides polyphenols with antioxidant activity. Choose dark chocolate with at least 70 percent pure cocoa and have an ounce a few times a week. Fruit sorbet is a better option than other frozen desserts. RED WINE  How much: Optional, no more than 1-2 glasses per day  Healthy choices: Organic red wine   Why: Red wine has beneficial antioxidant activity. Limit intake to no more than 1-2 servings per day. If you do not drink alcohol, do not start. SUPPLEMENTS  How much: Daily   Healthy choices: High quality multivitamin/multimineral that includes key antioxidants (vitamin C, vitamin E, mixed carotenoids, and selenium); co-enzyme Q10; 2-3 grams of a molecularly distilled fish oil; 2,000 IU of vitamin D3   Why: Supplements help fill any gaps in your diet when you are unable to get your daily requirement of micronutrients. Click here to learn more about supplements and get your free recommendation. TEA  How much: 2-4 cups per day  Healthy choices: White, green, oolong teas  Why: Tea is rich in catechins, antioxidant compounds that reduce inflammation. Purchase high-quality tea and learn how to correctly brew it for maximum taste and health benefits. HEALTHY HERBS & SPICES  How much: Unlimited amounts  Healthy choices: Turmeric, dunham powder (which contains turmeric), sweta and garlic (dried and fresh), chili peppers, basil, cinnamon, rosemary, thyme  Why: Use these herbs and spices generously to season foods. Turmeric and sweta are powerful, natural anti-inflammatory agents.   OTHER SOURCES OF PROTEIN  How much: 1-2 servings a week (one portion is equal to 1 ounce of cheese, 1 eight-ounce serving of dairy, 1 egg, 3 ounces cooked poultry or skinless meat)  Healthy choices: High quality natural cheese and yogurt, omega-3 enriched eggs, skinless poultry, grass-fed lean meats  Why: In general, try to reduce consumption of animal foods. If you eat chicken, choose organic, cage-free chicken and remove the skin and associated fat. Use organic, reduced-fat dairy products moderately, especially yogurt and natural cheeses such as Emmental (Swiss), [place] and true ROSS. If you eat eggs, choose omega-3 enriched eggs (made by feeding hens a flax-meal-enriched diet), or organic eggs from free-range chickens. COOKED  MUSHROOMS  How much: Unlimited amounts  Healthy choices: Shiitake, enokidake, maitake, oyster mushrooms (and wild mushrooms if available)   Why: These mushrooms contain compounds that enhance immune function. Never eat mushrooms raw, and minimize consumption of common commercial button mushrooms (including crimini and portobello). WHOLE SOY FOODS  How much: 1-2 servings per day (one serving is equal to ½ cup tofu or tempeh, 1 cup soymilk, ½ cup cooked edamame, 1 ounce of soynuts)  Healthy choices: Tofu, tempeh, edamame, soy nuts, soymilk  Why: Soy foods contain isoflavones that have antioxidant activity and are protective against cancer. Choose whole soy foods over fractionated foods like isolated soy protein powders and imitation meats made with soy isolate. FISH & SEAFOOD  How much:  2-6 servings per week (one serving is equal to 4 ounces of fish or seafood)  Healthy choices: Wild [place] salmon (especially sockeye), herring, sardines, and black cod (sablefish)  Why: These fish are rich in omega-3 fats, which are strongly anti-inflammatory. If you choose not to eat fish, take a molecularly distilled fish oil supplement that provides both EPA and DHA in a dose of 2-3 grams per day. HEALTHY FATS  How much:  5-7 servings per day (one serving is equal to 1 teaspoon of oil, 2 walnuts, 1 tablespoon of flaxseed, 1 ounce of avocado)   Healthy choices: For cooking, use extra virgin olive oil and expeller-pressed organic canola oil.  Other sources of healthy fats include nuts (especially walnuts), avocados, and seeds - including hemp seeds and freshly ground flaxseed. Omega-3 fats are also found in cold water fish, omega-3 enriched eggs, and whole soy foods. Organic, expeller pressed, high-oleic sunflower or safflower oils may also be used, as well as walnut and hazelnut oils in salads and dark roasted sesame oil as a flavoring for soups and stir-fries  Why: Healthy fats are those rich in either monounsaturated or omega-3 fats. Extra-virgin olive oil is rich in polyphenols with antioxidant activity and canola oil contains a small fraction of omega-3 fatty acids. WHOLE & CRACKED GRAINS  How much:  3-5 servings a day (one serving is equal to about ½ cup cooked grains)  Healthy choices: [place], basmati rice, wild rice, buckwheat, groats, barley, quinoa, steel-cut oats   Why: Whole grains digest slowly, reducing frequency of spikes in blood sugar that promote inflammation. \"Whole grains\" means grains that are intact or in a few large pieces, not whole wheat bread or other products made from flour. PASTA (al dente)  How much: 2-3 servings per week (one serving is equal to about ½ cup cooked pasta)  Healthy choices: Organic pasta, rice noodles, bean thread noodles, and part whole wheat and buckwheat noodles like Japanese udon and soba  Why: Pasta cooked al dente (when it has \"tooth\" to it) has a lower glycemic index than fully-cooked pasta. Low-glycemic-load carbohydrates should be the bulk of your carbohydrate intake to help minimize spikes in blood glucose levels. BEANS & LEGUMES  How much: 1-2 servings per day (one serving is equal to ½ cup cooked beans or legumes)  Healthy choices: Beans like [place]asazi, adzuki and black, as well as chickpeas, black-eyed peas and lentils  Why: Beans are rich in folic acid, magnesium, potassium and soluble fiber. They are a low-glycemic-load food.   Eat them well-cooked either whole or pureed into spreads like hummus. VEGETABLES  How much: 4-5 servings per day minimum (one serving is equal to 2 cups salad greens, ½ cup vegetables cooked, raw or juiced)  Healthy Choices: Lightly cooked dark leafy greens (spinach, henry greens, kale, Swiss chard), cruciferous vegetables (broccoli, cabbage, [place] sprouts, kale, bok rickey and cauliflower), carrots, beets, onions, peas, squashes, sea vegetables and washed raw salad greens  Why: Vegetables are rich in flavonoids and carotenoids with both antioxidant and anti-inflammatory activity. Go for a wide range of colors, eat them both raw and cooked, and choose organic when possible. FRUITS  How much:  3-4 servings per day (one serving is equal to 1 medium size piece of fruit, ½ cup chopped fruit, ¼ cup of dried fruit)  Healthy choices: Raspberries, blueberries, strawberries, peaches, nectarines, oranges, pink grapefruit, red grapes, plums, pomegranates, blackberries, cherries, apples, and pears - all lower in glycemic load than most tropical fruits  Why: Fruits are rich in flavonoids and carotenoids with both antioxidant and anti-inflammatory activity. Go for a wide range of colors, choose fruit that is fresh in season or frozen, and buy organic when possible. Additional Item:  WATER  How much: Throughout the day  Healthy choices: Drink pure water, or drinks that are mostly water (tea, very diluted fruit juice, sparkling water with lemon) throughout the day. Why: Water is vital for overall functioning of the body.         Microbiome. - gut bacteria      Dr. Bonita Webb [facility]    Stop dairy and wheat for 2 weeks at least.

## 2018-05-25 NOTE — PROGRESS NOTES
Chief Complaint   Patient presents with   [facility] patient, here to establish care. 1. Have you been to the ER, urgent care clinic since your last visit? Hospitalized since your last visit? No    2. Have you seen or consulted any other health care providers outside of the [address] [place] [address] since your last visit? Include any pap smears or colon screening.  No     Health Maintenance Due   Topic Date Due    Hepatitis C Screening  1965    DTaP/Tdap/Td series (1 - Tdap) 06/03/1985    PAP AKA CERVICAL CYTOLOGY  06/03/1985    BREAST CANCER SCRN MAMMOGRAM  06/03/2014    FOBT Q 1 YEAR AGE 50-75  06/03/2014

## 2018-08-21 ENCOUNTER — OFFICE VISIT (OUTPATIENT)
Dept: INTERNAL MEDICINE CLINIC | Age: 53
End: 2018-08-21

## 2018-08-21 VITALS
WEIGHT: 142.6 LBS | HEART RATE: 86 BPM | DIASTOLIC BLOOD PRESSURE: 80 MMHG | BODY MASS INDEX: 23.76 KG/M2 | TEMPERATURE: 98.8 F | SYSTOLIC BLOOD PRESSURE: 100 MMHG | RESPIRATION RATE: 18 BRPM | HEIGHT: 65 IN | OXYGEN SATURATION: 98 %

## 2018-08-21 DIAGNOSIS — R79.89 ELEVATED LFTS: ICD-10-CM

## 2018-08-21 DIAGNOSIS — M35.9 CONNECTIVE TISSUE DISEASE (HCC): ICD-10-CM

## 2018-08-21 DIAGNOSIS — Z00.00 ROUTINE GENERAL MEDICAL EXAMINATION AT A HEALTH CARE FACILITY: Primary | ICD-10-CM

## 2018-08-21 DIAGNOSIS — M62.830 BACK SPASM: ICD-10-CM

## 2018-08-21 NOTE — PROGRESS NOTES
HPI:  Brenna Gay is a 46y.o. year old female who is here for an annual physical:  LAST SEEN: 5/25/2018     Wt Readings from Last 3 Encounters:   08/21/18 142 lb 9.6 oz (64.7 kg)   05/25/18 144 lb 14.4 oz (65.7 kg)   03/26/15 158 lb 1.1 oz (71.7 kg)     Temp Readings from Last 3 Encounters:   08/21/18 98.8 °F (37.1 °C) (Oral)   05/25/18 98.3 °F (36.8 °C) (Oral)   03/26/15 98.4 °F (36.9 °C)     BP Readings from Last 3 Encounters:   08/21/18 100/80   05/25/18 110/70   03/26/15 92/52     Pulse Readings from Last 3 Encounters:   08/21/18 86   05/25/18 80   03/26/15 88        She reports the following history and medical concerns:      Didn't make an appointment for rheumatologist second opinion    Acupuncture helped some with back as the Personal training. Back pain is non existent. Taking diclofenac only as needed    Still taking plaquenil. Rash on vacation- sun exposure. No itching. Stopped plaquenil temporarily. Restarted. It never helped her joint stiffness. Pt wants to see rheumatologist (new one) to get answers about whether she truly has autoimmune disease    Fatty liver- back in the 90's had increased LFT's going up and down. Ultrasound 2016. Needs mammogram- pt will call  Colonoscopy done in 2014. Td within 10 years done    Taking tramadol as needed. Assessment and Plan        1. Routine general medical examination at a health care facility  Well exam but still concern about high LFT. Needs mammogram  Tdap given as rx (supply)  - CBC WITH AUTOMATED DIFF  - METABOLIC PANEL, COMPREHENSIVE  - TSH REFLEX TO T4  - VITAMIN D, 25 HYDROXY  - UA/M W/RFLX CULTURE, ROUTINE  - MICROALBUMIN, UR, RAND W/ MICROALB/CREAT RATIO  - LIPID PANEL    2. Elevated LFTs  No work up done. Follow up liver on ultrasound. Rule out autoimmune etiology.   - HEPATITIS C AB  - HEP B SURFACE AG  - HEPATITIS B CORE AB W/REFLEX  - ACTIN (SMOOTH MUSCLE) ANTIBODY  - US ABD LTD; Future    3.  Connective tissue disease ([facility]  Pt wants second opinion if she has a CTD or needs plaquenil. It doesn't help and she never had high inflammation. Asked her to get records from rheumatology. xrays show no joint destruction  - C REACTIVE PROTEIN, QT    4. Back spasm  Acupuncture helped.  helped. No back pain now. Historical Data    Vitals:    08/21/18 0817   BP: 100/80   Pulse: 86   Resp: 18   Temp: 98.8 °F (37.1 °C)   TempSrc: Oral   SpO2: 98%   Weight: 142 lb 9.6 oz (64.7 kg)   Height: 5' 5\" (1.651 m)         Past Medical History:   Diagnosis Date    Autoimmune disease ([facility]     Back spasm 5/25/2018    [facility] ([facility] 5/25/2018    Esophageal stricture     Menopause        Past Surgical History:   Procedure Laterality Date    HX APPENDECTOMY      HX HYSTEROSCOPY WITH ENDOMETRIAL ABLATION         Outpatient Encounter Prescriptions as of 8/21/2018   Medication Sig Dispense Refill    diclofenac EC (VOLTAREN) 75 mg EC tablet TAKE 1 TABLET WITH FOOD OR MILK TWICE A DAY AS NEEDED ORALLY 30 DAY(S)  0    melatonin tab tablet Take  by mouth nightly.  hydroxychloroquine (PLAQUENIL) 200 mg tablet Take 200 mg by mouth two (2) times a day.  traMADol (ULTRAM) 50 mg tablet Take 50 mg by mouth every six (6) hours as needed for Pain.  fexofenadine (ALLEGRA) 180 mg tablet Take 180 mg by mouth daily as needed. Indications: SEASONAL ALLERGIC RHINITIS      multivitamin (ONE A DAY) tablet Take 1 Tab by mouth daily. No facility-administered encounter medications on file as of 8/21/2018.          Allergies   Allergen Reactions    Sulindac Hives, Shortness of Breath, Palpitations and Other (comments)    Talwin [Pentazocine Lactate] Hives        Social History     Social History    Marital status:      Spouse name: N/A    Number of children: N/A    Years of education: N/A     Occupational History    [employer]      Social History Main Topics    Smoking status: Never Smoker  Smokeless tobacco: Never Used    Alcohol use No    Drug use: No    Sexual activity: Not on file     Other Topics Concern    Not on file     Social History Narrative        family history includes Lung Cancer in her mother; Pancreatic Cancer in her brother. Review of Systems   Constitutional: Negative for chills, diaphoresis, fever, malaise/fatigue and weight loss. HENT: Negative for hearing loss. Respiratory: Negative for cough. Cardiovascular: Negative for chest pain. Gastrointestinal: Negative for blood in stool and constipation. Genitourinary: Negative for dysuria, flank pain, frequency and urgency. Musculoskeletal: Positive for joint pain. Negative for myalgias. Skin: Negative for rash. Neurological: Negative for dizziness, weakness and headaches. Endo/Heme/Allergies: Does not bruise/bleed easily. Visit Vitals    /80 (BP 1 Location: Right arm, BP Patient Position: Sitting)    Pulse 86    Temp 98.8 °F (37.1 °C) (Oral)    Resp 18    Ht 5' 5\" (1.651 m)    Wt 142 lb 9.6 oz (64.7 kg)    SpO2 98%    BMI 23.73 kg/m2         Physical Exam   Constitutional: She is oriented to person, place, and time and well-developed, well-nourished, and in no distress. No distress. HENT:   Nose: Nose normal.   Mouth/Throat: Oropharynx is clear and moist.   Eyes: Conjunctivae and EOM are normal.   Neck: Normal range of motion. Neck supple. No thyromegaly present. Cardiovascular: Normal rate, regular rhythm and normal heart sounds. Exam reveals no gallop and no friction rub. Pulmonary/Chest: Effort normal and breath sounds normal. No respiratory distress. She has no wheezes. She has no rales. Abdominal: Soft. Bowel sounds are normal. She exhibits no distension. There is no tenderness. Musculoskeletal: Normal range of motion. She exhibits no edema, tenderness or deformity. Lymphadenopathy:     She has no cervical adenopathy.    Neurological: She is alert and oriented to person, place, and time. Skin: Skin is warm and dry. No rash noted. Psychiatric: Affect and judgment normal.     Ortho Exam     Assessment:  See Above for discussion/plan. Annual Physical completed. Counseling and risk factor reduction topics were discussed such as exercise, symptom diary. Walking daily. Healthy eating      I have reviewed the patient's medical history in detail and updated the computerized patient record. We had a prolonged discussion about these complex clinical issues and went over the various important aspects to consider. All questions were answered. Advised her to call back or return to office if symptoms do not improve, change in nature, or persist.    She was given an after visit summary or informed of [employer] which includes patient instructions, diagnoses, current medications, & vitals. She expressed understanding with the diagnosis and plan.

## 2018-08-21 NOTE — MR AVS SNAPSHOT
[address], [address] [place] [address] 
[phone] Patient: Toni Hilario MRN: [id number] EDC:[birth year] Visit Information Date & Time Provider Department Dept. Phone Encounter #  
 8/21/2018  8:15 AM Kandy Leon MD [facility] [phone] [id number] Upcoming Health Maintenance Date Due Hepatitis C Screening 1965 DTaP/Tdap/Td series (1 - Tdap) 6/3/1985 PAP AKA CERVICAL CYTOLOGY 6/3/1985 BREAST CANCER SCRN MAMMOGRAM 6/3/2014 Influenza Age 5 to Adult 8/1/2018 COLONOSCOPY 7/17/2024 Allergies as of 8/21/2018  Review Complete On: 8/21/2018 By: Kandy Leon MD  
  
 Severity Noted Reaction Type Reactions Sulindac High 03/26/2015   Systemic Hives, Shortness of Breath, Palpitations, Other (comments) Talwin [Pentazocine Lactate]  03/25/2015    Hives Current Immunizations  Reviewed on 8/21/2018 Name Date Influenza Vaccine PF 3/26/2015  9:22 AM  
 Pneumococcal Polysaccharide (PPSV-23) 3/26/2015  9:24 AM  
  
 Reviewed by Kandy Leon MD on 8/21/2018 at  8:51 AM  
You Were Diagnosed With   
  
 Codes Comments Routine general medical examination at a health care facility    -  Primary ICD-10-CM: Z00.00 ICD-9-CM: V70.0 Elevated LFTs     ICD-10-CM: R79.89 ICD-9-CM: 790.6 Connective tissue disease ([facility]     ICD-10-CM: M35.9 ICD-9-CM: 710.9 Vitals BP Pulse Temp Resp Height(growth percentile) Weight(growth percentile) 100/80 (BP 1 Location: Right arm, BP Patient Position: Sitting) 86 98.8 °F (37.1 °C) (Oral) 18 5' 5\" (1.651 m) 142 lb 9.6 oz (64.7 kg) SpO2 BMI OB Status Smoking Status 98% 23.73 kg/m2 Menopause Never Smoker Vitals History BMI and BSA Data Body Mass Index Body Surface Area  
 23.73 kg/m 2 1.72 m 2 Preferred Pharmacy Pharmacy Name Phone [id number] [place], [address] [phone] Your Updated Medication List  
  
   
This list is accurate as of 18  8:58 AM.  Always use your most recent med list. ALLEGRA 180 mg tablet Generic drug:  fexofenadine Take 180 mg by mouth daily as needed. Indications: SEASONAL ALLERGIC RHINITIS  
  
 diclofenac EC 75 mg EC tablet Commonly known as:  VOLTAREN  
TAKE 1 TABLET WITH FOOD OR MILK TWICE A DAY AS NEEDED ORALLY 30 DAY(S)  
  
 diph,Pertuss(Acell),Tet Vac-PF 2 Lf-(2.5-5-3-5 mcg)-5Lf/0.5 mL susp Commonly known as:  ADACEL  
0.5 mL by IntraMUSCular route once for 1 dose.  
  
 hydroxychloroquine 200 mg tablet Commonly known as:  PLAQUENIL Take 200 mg by mouth two (2) times a day. melatonin Tab tablet Take  by mouth nightly. multivitamin tablet Commonly known as:  ONE A DAY Take 1 Tab by mouth daily. traMADol 50 mg tablet Commonly known as:  ULTRAM  
Take 50 mg by mouth every six (6) hours as needed for Pain. Prescriptions Printed Refills diph,Pertuss,Acell,,Tet Vac-PF (ADACEL) 2 Lf-(2.5-5-3-5 mcg)-5Lf/0.5 mL susp 0 Si.5 mL by IntraMUSCular route once for 1 dose. Class: Print Route: IntraMUSCular We Performed the Following ACTIN (SMOOTH MUSCLE) ANTIBODY [id number] CPT(R)] C REACTIVE PROTEIN, QT [89565 CPT(R)] CBC WITH AUTOMATED DIFF [85639 CPT(R)] HEP B SURFACE AG V7880799 CPT(R)] HEPATITIS B CORE AB W/REFLEX [[id number] Custom] HEPATITIS C AB [17330 CPT(R)] LIPID PANEL [41670 CPT(R)] METABOLIC PANEL, COMPREHENSIVE [84810 CPT(R)] MICROALBUMIN, UR, RAND W/ MICROALB/CREAT RATIO [id number] CPT(R)] TSH REFLEX TO T4 [11703 CPT(R)] UA/M W/RFLX CULTURE, ROUTINE [[id number] Custom] VITAMIN D, 25 HYDROXY [id number] CPT(R)] To-Do List   
 2018 Imaging:  [facility]! Dear Daniela Avina: Thank you for requesting a [employer] account. Our records indicate that you already have an active [employer] account. You can access your account anytime at [email or web address] Did you know that you can access your hospital and ER discharge instructions at any time in [employer]? You can also review all of your test results from your hospital stay or ER visit. Additional Information If you have questions, please visit the Frequently Asked Questions section of the [employer] website at [email or web address] Remember, [employer] is NOT to be used for urgent needs. For medical emergencies, dial 911. Now available from your iPhone and Android! Please provide this summary of care documentation to your next provider. Your primary care clinician is listed as Jazzy Barakat. If you have any questions after today's visit, please call [phone].

## 2018-08-21 NOTE — PROGRESS NOTES
Reviewed record in preparation for visit and have obtained necessary documentation. Identified pt with two pt identifiers(name and ). Health Maintenance Due   Topic    Hepatitis C Screening     DTaP/Tdap/Td series (1 - Tdap)    PAP AKA CERVICAL CYTOLOGY     BREAST CANCER SCRN MAMMOGRAM     FOBT Q 1 YEAR AGE 54-65     Influenza Age 5 to Adult          Chief Complaint   Patient presents with    Complete Physical    Cough     Patient c/o coughing x 4 days. Wt Readings from Last 3 Encounters:   18 142 lb 9.6 oz (64.7 kg)   18 144 lb 14.4 oz (65.7 kg)   03/26/15 158 lb 1.1 oz (71.7 kg)     Temp Readings from Last 3 Encounters:   18 98.3 °F (36.8 °C) (Oral)   03/26/15 98.4 °F (36.9 °C)     BP Readings from Last 3 Encounters:   18 110/70   03/26/15 92/52     Pulse Readings from Last 3 Encounters:   18 80   03/26/15 88           Learning Assessment:  :     Learning Assessment 2018   PRIMARY LEARNER Patient   HIGHEST LEVEL OF EDUCATION - PRIMARY LEARNER  4 YEARS OF COLLEGE   BARRIERS PRIMARY LEARNER NONE   CO-LEARNER CAREGIVER No   PRIMARY LANGUAGE ENGLISH   LEARNER PREFERENCE PRIMARY VIDEOS   ANSWERED BY patient   RELATIONSHIP SELF       Depression Screening:  :     PHQ over the last two weeks 2018   Little interest or pleasure in doing things Not at all   Feeling down, depressed, irritable, or hopeless Not at all   Total Score PHQ 2 0       Fall Risk Assessment:  :     Fall Risk Assessment, last 12 mths 2018   Able to walk? Yes   Fall in past 12 months? No       Abuse Screening:  :     Abuse Screening Questionnaire 2018   Do you ever feel afraid of your partner? N   Are you in a relationship with someone who physically or mentally threatens you? N   Is it safe for you to go home?  Y       Coordination of Care Questionnaire:  :     1) Have you been to an emergency room, urgent care clinic since your last visit? no   Hospitalized since your last visit? no             2) Have you seen or consulted any other health care providers outside of [address] [place] [address] since your last visit? yes Dr. Maico Malik 5/2018  (Include any pap smears or colon screenings in this section.)    3) Do you have an Advance Directive on file? no    4) Are you interested in receiving information on Advance Directives? YES      Patient is accompanied by self I have received verbal consent from Luiza Longoria to discuss any/all medical information while they are present in the room. Ramona Sawyer b

## 2018-08-25 LAB — HBV SURFACE AG SERPL QL IA: NEGATIVE

## 2018-08-26 LAB
25(OH)D3+25(OH)D2 SERPL-MCNC: 44.9 NG/ML (ref 30–100)
ACTIN IGG SERPL-ACNC: 9 UNITS (ref 0–19)
ALBUMIN SERPL-MCNC: 4.5 G/DL (ref 3.5–5.5)
ALBUMIN/CREAT UR: 7 MG/G CREAT (ref 0–30)
ALBUMIN/GLOB SERPL: 1.7 {RATIO} (ref 1.2–2.2)
ALP SERPL-CCNC: 136 IU/L (ref 39–117)
ALT SERPL-CCNC: 66 IU/L (ref 0–32)
APPEARANCE UR: CLEAR
AST SERPL-CCNC: 52 IU/L (ref 0–40)
BACTERIA #/AREA URNS HPF: NORMAL /[HPF]
BASOPHILS # BLD AUTO: 0 X10E3/UL (ref 0–0.2)
BASOPHILS NFR BLD AUTO: 0 %
BILIRUB SERPL-MCNC: 0.4 MG/DL (ref 0–1.2)
BILIRUB UR QL STRIP: NEGATIVE
BUN SERPL-MCNC: 10 MG/DL (ref 6–24)
BUN/CREAT SERPL: 14 (ref 9–23)
CALCIUM SERPL-MCNC: 9.9 MG/DL (ref 8.7–10.2)
CASTS URNS QL MICRO: NORMAL /LPF
CHLORIDE SERPL-SCNC: 103 MMOL/L (ref 96–106)
CHOLEST SERPL-MCNC: 212 MG/DL (ref 100–199)
CO2 SERPL-SCNC: 24 MMOL/L (ref 20–29)
COLOR UR: YELLOW
CREAT SERPL-MCNC: 0.71 MG/DL (ref 0.57–1)
CREAT UR-MCNC: 57.5 MG/DL
CRP SERPL-MCNC: 5.3 MG/L (ref 0–4.9)
EOSINOPHIL # BLD AUTO: 0.1 X10E3/UL (ref 0–0.4)
EOSINOPHIL NFR BLD AUTO: 2 %
EPI CELLS #/AREA URNS HPF: NORMAL /HPF
ERYTHROCYTE [DISTWIDTH] IN BLOOD BY AUTOMATED COUNT: 12.7 % (ref 12.3–15.4)
GLOBULIN SER CALC-MCNC: 2.7 G/DL (ref 1.5–4.5)
GLUCOSE SERPL-MCNC: 95 MG/DL (ref 65–99)
GLUCOSE UR QL: NEGATIVE
HBV CORE AB SERPL QL IA: NEGATIVE
HCT VFR BLD AUTO: 40.8 % (ref 34–46.6)
HCV AB S/CO SERPL IA: <0.1 S/CO RATIO (ref 0–0.9)
HDLC SERPL-MCNC: 70 MG/DL
HGB BLD-MCNC: 13.6 G/DL (ref 11.1–15.9)
HGB UR QL STRIP: NEGATIVE
IMM GRANULOCYTES # BLD: 0 X10E3/UL (ref 0–0.1)
IMM GRANULOCYTES NFR BLD: 0 %
INTERPRETATION, 910389: NORMAL
KETONES UR QL STRIP: NEGATIVE
LDLC SERPL CALC-MCNC: 127 MG/DL (ref 0–99)
LEUKOCYTE ESTERASE UR QL STRIP: NEGATIVE
LYMPHOCYTES # BLD AUTO: 1.6 X10E3/UL (ref 0.7–3.1)
LYMPHOCYTES NFR BLD AUTO: 21 %
MCH RBC QN AUTO: 32.6 PG (ref 26.6–33)
MCHC RBC AUTO-ENTMCNC: 33.3 G/DL (ref 31.5–35.7)
MCV RBC AUTO: 98 FL (ref 79–97)
MICRO URNS: NORMAL
MICRO URNS: NORMAL
MICROALBUMIN UR-MCNC: 4 UG/ML
MONOCYTES # BLD AUTO: 0.5 X10E3/UL (ref 0.1–0.9)
MONOCYTES NFR BLD AUTO: 7 %
MUCOUS THREADS URNS QL MICRO: PRESENT
NEUTROPHILS # BLD AUTO: 5 X10E3/UL (ref 1.4–7)
NEUTROPHILS NFR BLD AUTO: 70 %
NITRITE UR QL STRIP: NEGATIVE
PH UR STRIP: 5.5 [PH] (ref 5–7.5)
PLATELET # BLD AUTO: 302 X10E3/UL (ref 150–379)
POTASSIUM SERPL-SCNC: 4.4 MMOL/L (ref 3.5–5.2)
PROT SERPL-MCNC: 7.2 G/DL (ref 6–8.5)
PROT UR QL STRIP: NEGATIVE
RBC # BLD AUTO: 4.17 X10E6/UL (ref 3.77–5.28)
RBC #/AREA URNS HPF: NORMAL /HPF
SODIUM SERPL-SCNC: 142 MMOL/L (ref 134–144)
SP GR UR: 1.01 (ref 1–1.03)
TRIGL SERPL-MCNC: 77 MG/DL (ref 0–149)
TSH SERPL DL<=0.005 MIU/L-ACNC: 1.71 UIU/ML (ref 0.45–4.5)
URINALYSIS REFLEX, 377202: NORMAL
UROBILINOGEN UR STRIP-MCNC: 0.2 MG/DL (ref 0.2–1)
VLDLC SERPL CALC-MCNC: 15 MG/DL (ref 5–40)
WBC # BLD AUTO: 7.2 X10E3/UL (ref 3.4–10.8)
WBC #/AREA URNS HPF: NORMAL /HPF

## 2018-08-30 NOTE — PROGRESS NOTES
The work up for your blood tests was negative for any etiology including hepatitis virus and an autoimmune disease. We will wait for your ultrasound of your liver now Message sent to patient via [employer]: 
August 29, 2018

## 2018-11-13 ENCOUNTER — TELEPHONE (OUTPATIENT)
Dept: INTERNAL MEDICINE CLINIC | Age: 53
End: 2018-11-13

## 2018-11-13 NOTE — TELEPHONE ENCOUNTER
Patient would like the order for her ultrasound faxed to [facility] at [phone]. Please call patient back at [phone] when this has been done.

## 2018-11-20 ENCOUNTER — OFFICE VISIT (OUTPATIENT)
Dept: INTERNAL MEDICINE CLINIC | Age: 53
End: 2018-11-20

## 2018-11-20 VITALS
TEMPERATURE: 98.1 F | HEIGHT: 65 IN | SYSTOLIC BLOOD PRESSURE: 100 MMHG | HEART RATE: 84 BPM | RESPIRATION RATE: 18 BRPM | DIASTOLIC BLOOD PRESSURE: 76 MMHG | BODY MASS INDEX: 23.96 KG/M2 | WEIGHT: 143.8 LBS | OXYGEN SATURATION: 98 %

## 2018-11-20 DIAGNOSIS — R05.9 COUGH: ICD-10-CM

## 2018-11-20 DIAGNOSIS — M35.9 CONNECTIVE TISSUE DISEASE (HCC): Primary | ICD-10-CM

## 2018-11-20 DIAGNOSIS — Z23 ENCOUNTER FOR IMMUNIZATION: ICD-10-CM

## 2018-11-20 NOTE — PATIENT INSTRUCTIONS
Vaccine Information Statement Influenza (Flu) Vaccine (Inactivated or Recombinant): What you need to know Many Vaccine Information Statements are available in Maltese and other languages. See www.immunize.org/vis Hojas de Información Sobre Vacunas están disponibles en Español y en muchos otros idiomas. Visite www.immunize.org/vis 1. Why get vaccinated? Influenza (flu) is a contagious disease that spreads around the [place] every year, usually between October and May. Flu is caused by influenza viruses, and is spread mainly by coughing, sneezing, and close contact. Anyone can get flu. Flu strikes suddenly and can last several days. Symptoms vary by age, but can include: 
 fever/chills  sore throat  muscle aches  fatigue  cough  headache  runny or stuffy nose Flu can also lead to pneumonia and blood infections, and cause diarrhea and seizures in children. If you have a medical condition, such as heart or lung disease, flu can make it worse. Flu is more dangerous for some people. Infants and young children, people 72years of age and older, pregnant women, and people with certain health conditions or a weakened immune system are at greatest risk. Each year thousands of people in the [facility] die from flu, and many more are hospitalized. Flu vaccine can: 
 keep you from getting flu, 
 make flu less severe if you do get it, and 
 keep you from spreading flu to your family and other people. 2. Inactivated and recombinant flu vaccines A dose of flu vaccine is recommended every flu season. Children 6 months through 6years of age may need two doses during the same flu season. Everyone else needs only one dose each flu season.   
 
 
Some inactivated flu vaccines contain a very small amount of a mercury-based preservative called thimerosal. Studies have not shown thimerosal in vaccines to be harmful, but flu vaccines that do not contain thimerosal are available. There is no live flu virus in flu shots. They cannot cause the flu. There are many flu viruses, and they are always changing. Each year a new flu vaccine is made to protect against three or four viruses that are likely to cause disease in the upcoming flu season. But even when the vaccine doesnt exactly match these viruses, it may still provide some protection Flu vaccine cannot prevent: 
 flu that is caused by a virus not covered by the vaccine, or 
 illnesses that look like flu but are not. It takes about 2 weeks for protection to develop after vaccination, and protection lasts through the flu season. 3. Some people should not get this vaccine Tell the person who is giving you the vaccine:  If you have any severe, life-threatening allergies. If you ever had a life-threatening allergic reaction after a dose of flu vaccine, or have a severe allergy to any part of this vaccine, you may be advised not to get vaccinated. Most, but not all, types of flu vaccine contain a small amount of egg protein.  If you ever had Guillain-Barré Syndrome (also called GBS). Some people with a history of GBS should not get this vaccine. This should be discussed with your doctor.  If you are not feeling well. It is usually okay to get flu vaccine when you have a mild illness, but you might be asked to come back when you feel better. 4. Risks of a vaccine reaction With any medicine, including vaccines, there is a chance of reactions. These are usually mild and go away on their own, but serious reactions are also possible. Most people who get a flu shot do not have any problems with it. Minor problems following a flu shot include:  
 soreness, redness, or swelling where the shot was given  hoarseness  sore, red or itchy eyes  cough  fever  aches  headache  itching  fatigue If these problems occur, they usually begin soon after the shot and last 1 or 2 days. More serious problems following a flu shot can include the following:  There may be a small increased risk of Guillain-Barré Syndrome (GBS) after inactivated flu vaccine. This risk has been estimated at 1 or 2 additional cases per million people vaccinated. This is much lower than the risk of severe complications from flu, which can be prevented by flu vaccine.  Young children who get the flu shot along with pneumococcal vaccine (PCV13) and/or DTaP vaccine at the same time might be slightly more likely to have a seizure caused by fever. Ask your doctor for more information. Tell your doctor if a child who is getting flu vaccine has ever had a seizure. Problems that could happen after any injected vaccine:  People sometimes faint after a medical procedure, including vaccination. Sitting or lying down for about 15 minutes can help prevent fainting, and injuries caused by a fall. Tell your doctor if you feel dizzy, or have vision changes or ringing in the ears.  Some people get severe pain in the shoulder and have difficulty moving the arm where a shot was given. This happens very rarely.  Any medication can cause a severe allergic reaction. Such reactions from a vaccine are very rare, estimated at about 1 in a million doses, and would happen within a few minutes to a few hours after the vaccination. As with any medicine, there is a very remote chance of a vaccine causing a serious injury or death. The safety of vaccines is always being monitored. For more information, visit: www.cdc.gov/vaccinesafety/ 
 
5. What if there is a serious reaction? What should I look for?  Look for anything that concerns you, such as signs of a severe allergic reaction, very high fever, or unusual behavior.  
 
Signs of a severe allergic reaction can include hives, swelling of the face and throat, difficulty breathing, a fast heartbeat, dizziness, and weakness  usually within a few minutes to a few hours after the vaccination. What should I do?  If you think it is a severe allergic reaction or other emergency that cant wait, call 9-1-1 and get the person to the nearest hospital. Otherwise, call your doctor.  Reactions should be reported to the Vaccine Adverse Event Reporting System (VAERS). Your doctor should file this report, or you can do it yourself through  the VAERS web site at www.vaers. [facility].gov, or by calling 3-894.932.3314. VAERS does not give medical advice. 6. The National Vaccine Injury Compensation Program 
 
The [facility] Vaccine Injury Compensation Program (VICP) is a federal program that was created to compensate people who may have been injured by certain vaccines. Persons who believe they may have been injured by a vaccine can learn about the program and about filing a claim by calling 1-630.586.1902 or visiting the [employer] website at www.[facility].gov/vaccinecompensation. There is a time limit to file a claim for compensation. 7. How can I learn more?  Ask your healthcare provider. He or she can give you the vaccine package insert or suggest other sources of information.  Call your local or state health department.  Contact the Centers for Disease Control and Prevention (CDC): 
- Call 4-248.581.9445 (1-800-CDC-INFO) or 
- Visit CDCs website at www.cdc.gov/flu Vaccine Information Statement Inactivated Influenza Vaccine 8/7/2015 
42 CLEMENTE Ritchie 108SN-54 Department of Health and [employer] [place] Centers for Disease Control and Prevention Office Use Only Instruction on how to use steam to improve congestion Put two tbs of baking soda in a pot (quart) of water and heat to steam.  Take off fire and place face over steam with towel over your head and pot.   Allow congestion to come out of nasal passages and then come out of towel to blow your nose. Return into the steam tent. It also will help more effectively to put a few drops of peppermint oil or eucalyptus in the mixture. They key is to allow everything stuck in your sinuses and nose to come out so it is not a medium for infection. [place] sweta lozenges

## 2018-11-20 NOTE — PROGRESS NOTES
Follow-up (3 mth f/u) and Immunization/Injection HPI: 
Maria A Sanchez is a 46y.o. year old female who is here for a follow up visit. She was last seen by me on 8/21/2018. She reports the following: 
 
Saw rheumatology Dr. Ramírez Sandoval. Cut out dairy. Helped bowel issues. LFT's back to normal with rheumatology labs Wt Readings from Last 3 Encounters:  
11/20/18 143 lb 12.8 oz (65.2 kg) 08/21/18 142 lb 9.6 oz (64.7 kg) 05/25/18 144 lb 14.4 oz (65.7 kg) Had shingles in the past 5 years ago. Stung by the bee- get bad reactions. Did benadryl and pepcid. New Problem:  
  dry cough On and off for 6 months. No SOB. Not every night. Wants to know if she can not get ultrasound since LFT's back to normal.  [id number] Vivian Mendez not to do as long as she gets it rechecked with Dr. Ramírez Sandoval. Assessment and Plan 1. Connective tissue disease ([facility] [address] Pt on plaquenil once a day. Sees Dr. Ramírez Sandoval Overactive immune system. Stay on allegra daily 2. Encounter for immunization Immunization given. Discussed risks and benefits. Side effects. VIS given through visit summary via Mychart or paper copy if not on Mychart  
- INFLUENZA VIRUS VAC QUAD,SPLIT,PRESV FREE SYRINGE IM 
- CA IMMUNIZ ADMIN,1 SINGLE/COMB VAC/TOXOID 3. Cough Likely related to PND. Do steam solution to get out congestion Ultrasonic humidifier Lozenges (sweta [place]uka) Call if not better No SOB with exercise or rest. 
 
 
 
Visit Vitals /76 (BP 1 Location: Left arm, BP Patient Position: Sitting) Pulse 84 Temp 98.1 °F (36.7 °C) (Oral) Resp 18 Ht 5' 5\" (1.651 m) Wt 143 lb 12.8 oz (65.2 kg) SpO2 98% BMI 23.93 kg/m² Historical Data Past Medical History:  
Diagnosis Date  Autoimmune disease ([facility] [address]  Back spasm 5/25/2018  Connective tissue disease ([facility] [address] 5/25/2018  Esophageal stricture  Menopause Past Surgical History:  
Procedure Laterality Date  HX APPENDECTOMY  HX HYSTEROSCOPY WITH ENDOMETRIAL ABLATION Outpatient Encounter Medications as of 11/20/2018 Medication Sig Dispense Refill  hydroxychloroquine (PLAQUENIL) 200 mg tablet Take 200 mg by mouth two (2) times a day.  traMADol (ULTRAM) 50 mg tablet Take 50 mg by mouth every six (6) hours as needed for Pain.  fexofenadine (ALLEGRA) 180 mg tablet Take 180 mg by mouth daily as needed. Indications: SEASONAL ALLERGIC RHINITIS  multivitamin (ONE A DAY) tablet Take 1 Tab by mouth daily.  melatonin tab tablet Take  by mouth nightly.  [DISCONTINUED] diclofenac EC (VOLTAREN) 75 mg EC tablet TAKE 1 TABLET WITH FOOD OR MILK TWICE A DAY AS NEEDED ORALLY 30 DAY(S)  0 No facility-administered encounter medications on file as of 11/20/2018. Allergies Allergen Reactions  Sulindac Hives, Shortness of Breath, Palpitations and Other (comments)  Talwin [Pentazocine Lactate] Hives Social History Socioeconomic History  Marital status:  Spouse name: Not on file  Number of children: Not on file  Years of education: Not on file  Highest education level: Not on file Social Needs  Financial resource strain: Not on file  Food insecurity - worry: Not on file  Food insecurity - inability: Not on file  Transportation needs - medical: Not on file  Transportation needs - non-medical: Not on file Occupational History  Occupation: [employer] Tobacco Use  Smoking status: Never Smoker  Smokeless tobacco: Never Used Substance and Sexual Activity  Alcohol use: No  
 Drug use: No  
 Sexual activity: Not on file Other Topics Concern  Not on file Social History Narrative  Not on file  
  
 
family history includes Lung Cancer in her mother; Pancreatic Cancer in her brother. Review of Systems Constitutional: Negative for weight loss. Eyes: Negative for blurred vision. Respiratory: Negative for shortness of breath. Cardiovascular: Negative for chest pain. Gastrointestinal: Negative for abdominal pain. Genitourinary: Negative for dysuria and frequency. Skin: Negative for rash. Neurological: Negative for dizziness, focal weakness, weakness and headaches. Endo/Heme/Allergies: Negative for environmental allergies. Does not bruise/bleed easily. Physical Exam  
Constitutional: She appears well-developed and well-nourished. She is active. Non-toxic appearance. She does not have a sickly appearance. She does not appear ill. No distress. Eyes: Conjunctivae are normal. Right eye exhibits no discharge. Neck: Carotid bruit is not present. No thyroid mass and no thyromegaly present. Cardiovascular: Normal rate, regular rhythm, S1 normal, S2 normal, normal heart sounds and normal pulses. Exam reveals no gallop and no friction rub. Pulmonary/Chest: Effort normal and breath sounds normal. No respiratory distress. Abdominal: Soft. Bowel sounds are normal.  
Musculoskeletal: She exhibits no edema or deformity. Neurological: She is alert. Coordination normal.  
Skin: Skin is warm and dry. No rash noted. No pallor. Psychiatric: She has a normal mood and affect. Her behavior is normal.  
Vitals reviewed. Ortho Exam 
 
 
Orders Placed This Encounter  IN IMMUNIZ ADMIN,1 SINGLE/COMB VAC/TOXOID  Influenza virus vaccine (QUADRIVALENT PRES FREE SYRINGE) IM (05059) I have reviewed the patient's medical history in detail and updated the computerized patient record. We had a prolonged discussion about these complex clinical issues and went over the various important aspects to consider. All questions were answered. Advised her to call back or return to office if symptoms do not improve, change in nature, or persist. 
 
She was given an after visit summary or informed of TPACK Access which includes patient instructions, diagnoses, current medications, & vitals. She expressed understanding with the diagnosis and plan.

## 2018-11-20 NOTE — PROGRESS NOTES
Reviewed record in preparation for visit and have obtained necessary documentation. Identified pt with two pt identifiers(name and ). Health Maintenance Due Topic  DTaP/Tdap/Td series (1 - Tdap)  PAP AKA CERVICAL CYTOLOGY  Shingrix Vaccine Age 50> (1 of 2)  Influenza Age 5 to Adult Chief Complaint Patient presents with  Follow-up 3 mth f/u Wt Readings from Last 3 Encounters:  
18 143 lb 12.8 oz (65.2 kg) 18 142 lb 9.6 oz (64.7 kg) 18 144 lb 14.4 oz (65.7 kg) Temp Readings from Last 3 Encounters:  
18 98.8 °F (37.1 °C) (Oral) 18 98.3 °F (36.8 °C) (Oral) 03/26/15 98.4 °F (36.9 °C) BP Readings from Last 3 Encounters:  
18 100/80  
18 110/70  
03/26/15 92/52 Pulse Readings from Last 3 Encounters:  
18 86  
18 80  
03/26/15 88 Learning Assessment: 
:  
 
Learning Assessment 2018 PRIMARY LEARNER Patient HIGHEST LEVEL OF EDUCATION - PRIMARY LEARNER  4 YEARS OF COLLEGE  
BARRIERS PRIMARY LEARNER NONE  
CO-LEARNER CAREGIVER No  
PRIMARY LANGUAGE ENGLISH  
LEARNER PREFERENCE PRIMARY VIDEOS  
ANSWERED BY patient RELATIONSHIP SELF Depression Screening: 
:  
 
PHQ over the last two weeks 2018 Little interest or pleasure in doing things Not at all Feeling down, depressed, irritable, or hopeless Not at all Total Score PHQ 2 0 Fall Risk Assessment: 
:  
 
Fall Risk Assessment, last 12 mths 2018 Able to walk? Yes Fall in past 12 months? No  
 
 
Abuse Screening: 
:  
 
Abuse Screening Questionnaire 2018 Do you ever feel afraid of your partner? [facility] Rota Are you in a relationship with someone who physically or mentally threatens you? Hector Rota Is it safe for you to go home? Aamir Hayes Coordination of Care Questionnaire: 
:  
 
1) Have you been to an emergency room, urgent care clinic since your last visit? no  
Hospitalized since your last visit? no          
 
 2) Have you seen or consulted any other health care providers outside of [address] [place] [address] since your last visit? yes  Dr. Ricks Shows 10/2018 Dr. Briseida Dinero 11/2018 (Include any pap smears or colon screenings in this section.) 3) Do you have an Advance Directive on file? no 
 
4) Are you interested in receiving information on Advance Directives? NO Patient is accompanied by self I have received verbal consent from Yon Shin to discuss any/all medical information while they are present in the room.

## 2019-02-27 ENCOUNTER — OFFICE VISIT (OUTPATIENT)
Dept: INTERNAL MEDICINE CLINIC | Age: 54
End: 2019-02-27

## 2019-02-27 VITALS
WEIGHT: 146.7 LBS | SYSTOLIC BLOOD PRESSURE: 110 MMHG | RESPIRATION RATE: 14 BRPM | BODY MASS INDEX: 24.44 KG/M2 | DIASTOLIC BLOOD PRESSURE: 70 MMHG | TEMPERATURE: 98.2 F | OXYGEN SATURATION: 98 % | HEIGHT: 65 IN | HEART RATE: 90 BPM

## 2019-02-27 DIAGNOSIS — M62.830 BACK SPASM: Primary | ICD-10-CM

## 2019-02-27 NOTE — PROGRESS NOTES
Chief Complaint Patient presents with  Joint Pain f/u Reviewed record in preparation for visit and have obtained necessary documentation. Identified pt with two pt identifiers(name and ). Health Maintenance Due Topic  DTaP/Tdap/Td series (1 - Tdap)  Shingrix Vaccine Age 50> (1 of 2) Chief Complaint Patient presents with  Joint Pain f/u Wt Readings from Last 3 Encounters:  
19 146 lb 11.2 oz (66.5 kg)  
18 143 lb 12.8 oz (65.2 kg) 18 142 lb 9.6 oz (64.7 kg) Temp Readings from Last 3 Encounters:  
19 98.2 °F (36.8 °C) (Oral)  
18 98.1 °F (36.7 °C) (Oral) 18 98.8 °F (37.1 °C) (Oral) BP Readings from Last 3 Encounters:  
19 110/70  
18 100/76  
18 100/80 Pulse Readings from Last 3 Encounters:  
19 90  
18 84  
18 86 Learning Assessment: 
:  
 
Learning Assessment 2018 PRIMARY LEARNER Patient HIGHEST LEVEL OF EDUCATION - PRIMARY LEARNER  4 YEARS OF COLLEGE  
BARRIERS PRIMARY LEARNER NONE  
CO-LEARNER CAREGIVER No  
PRIMARY LANGUAGE ENGLISH  
LEARNER PREFERENCE PRIMARY VIDEOS  
ANSWERED BY patient RELATIONSHIP SELF Depression Screening: 
:  
 
3 most recent PHQ Screens 2019 Little interest or pleasure in doing things Not at all Feeling down, depressed, irritable, or hopeless Not at all Total Score PHQ 2 0 Fall Risk Assessment: 
:  
 
Fall Risk Assessment, last 12 mths 2018 Able to walk? Yes Fall in past 12 months? No  
 
 
Abuse Screening: 
:  
 
Abuse Screening Questionnaire 2018 Do you ever feel afraid of your partner? Sheng Bile Are you in a relationship with someone who physically or mentally threatens you? Sheng Bile Is it safe for you to go home? Gianna Mederos Coordination of Care Questionnaire: 
:  
 
1) Have you been to an emergency room, urgent care clinic since your last visit? no  
Hospitalized since your last visit? no          
 2) Have you seen or consulted any other health care providers outside of [address] [place] [address] since your last visit? no  (Include any pap smears or colon screenings in this section.) 3) Do you have an Advance Directive on file? no 
 
4) Are you interested in receiving information on Advance Directives? NO Patient is accompanied by self I have received verbal consent from Liborio Fernandez to discuss any/all medical information while they are present in the room. Reviewed record  In preparation for visit and have obtained necessary documentation.

## 2019-02-27 NOTE — PROGRESS NOTES
Primary Care Doctor is:  Ryan Bustos MD 
Joint Pain (f/u) LAST VISIT: 11/20/2018 HPI: 
Ousmane Rosas is a 46y.o. year old female who is here for an acute care visit and has the following concerns: 
 
Right back pain returned. Slacked off with exercising. Bursitis right hip. Did acupuncture and it helped. Same spot. No radiation. Assessment and Plan 1. Back spasm TIME OUT performed immediately prior to start of procedure:  
Tera Velazquez MD, have performed the following reviews on Ousmane Rosas 
 prior to the start of the procedure:  
 
* Patient was identified by name and date of birth * Agreement on procedure being performed was verified * Risks and Benefits explained to the patient * Procedure site verified and marked as necessary * Patient was positioned for comfort * Consent was given by patient Date of procedure: 2/27/2019 Procedure performed by:Richard Carley Boeck MD  
Patient assisted by: self How tolerated by patient: tolerated the procedure well with no complications Comments: none Patient explained the risks and benefits of acupuncture to help with the acute problem. There can be some soreness afterwards and the effect can be immediate to slightly delayed (2-3 days). If the problem persists or gets worse, please call back or send a my chart message. If you experience shortness of breath, please let me know immediately. Patient agreed to proceed with treatment. Seirin packaged needle used No. 5 (0.25) 30 mm  QTY 1 Points palpated and inserted 5-10 mm at marked points Patient tolerated procedure and felt great relief. Pt doing well with Dr. Lewis Yung. Pleased with referral 
 
 
Visit Vitals /70 (BP 1 Location: Left arm, BP Patient Position: Sitting) Pulse 90 Temp 98.2 °F (36.8 °C) (Oral) Resp 14 Ht 5' 5\" (1.651 m) Wt 146 lb 11.2 oz (66.5 kg) SpO2 98% BMI 24.41 kg/m² Historical Data Past Medical History:  
Diagnosis Date  Autoimmune disease ([facility] [address]  Back spasm 5/25/2018  Connective tissue disease ([facility] 5/25/2018  Esophageal stricture  Menopause Past Surgical History:  
Procedure Laterality Date  HX APPENDECTOMY  HX HYSTEROSCOPY WITH ENDOMETRIAL ABLATION Outpatient Encounter Medications as of 2/27/2019 Medication Sig Dispense Refill  hydroxychloroquine (PLAQUENIL) 200 mg tablet Take 200 mg by mouth two (2) times a day.  multivitamin (ONE A DAY) tablet Take 1 Tab by mouth daily.  melatonin tab tablet Take  by mouth nightly.  traMADol (ULTRAM) 50 mg tablet Take 50 mg by mouth every six (6) hours as needed for Pain.  fexofenadine (ALLEGRA) 180 mg tablet Take 180 mg by mouth daily as needed. Indications: SEASONAL ALLERGIC RHINITIS No facility-administered encounter medications on file as of 2/27/2019. Allergies Allergen Reactions  Sulindac Hives, Shortness of Breath, Palpitations and Other (comments)  Talwin [Pentazocine Lactate] Hives Social History Socioeconomic History  Marital status:  Spouse name: Not on file  Number of children: Not on file  Years of education: Not on file  Highest education level: Not on file Social Needs  Financial resource strain: Not on file  Food insecurity - worry: Not on file  Food insecurity - inability: Not on file  Transportation needs - medical: Not on file  Transportation needs - non-medical: Not on file Occupational History  Occupation: [employer] Tobacco Use  Smoking status: Never Smoker  Smokeless tobacco: Never Used Substance and Sexual Activity  Alcohol use: No  
 Drug use: No  
 Sexual activity: Not on file Other Topics Concern  Not on file Social History Narrative  Not on file  
  
 
family history includes Lung Cancer in her mother; Pancreatic Cancer in her brother. Review of Systems Constitutional: Negative for weight loss. HENT: Negative for sore throat. Eyes: Negative for blurred vision. Respiratory: Negative for shortness of breath. Cardiovascular: Negative for chest pain. Gastrointestinal: Negative for abdominal pain. Genitourinary: Negative for dysuria and frequency. Musculoskeletal: Positive for back pain and joint pain. Skin: Negative for rash. Neurological: Negative for dizziness, tingling, focal weakness, weakness and headaches. Endo/Heme/Allergies: Negative for environmental allergies. Does not bruise/bleed easily. Physical Exam  
Constitutional: She is oriented to person, place, and time and well-developed, well-nourished, and in no distress. Vital signs are normal. She appears not dehydrated. She appears healthy. Non-toxic appearance. She does not have a sickly appearance. No distress. Eyes: Conjunctivae are normal.  
Cardiovascular: Normal rate and regular rhythm. Pulmonary/Chest: Effort normal and breath sounds normal.  
Abdominal: Soft. Bowel sounds are normal. She exhibits no distension. There is no tenderness. Musculoskeletal: She exhibits no edema or deformity. Back: 
 
Neurological: She is alert and oriented to person, place, and time. Gait normal.  
Skin: Skin is warm and dry. Psychiatric: Mood and affect normal.  
 
Ortho Exam  
 
 
 
 
I have reviewed the patient's medical history in detail and updated the computerized patient record. We had a prolonged discussion about these complex clinical issues and went over the various important aspects to consider. All questions were answered. Advised her to call back or return to office if symptoms do not improve, change in nature, or persist. 
 
She was given an after visit summary or informed of [employer] which includes patient instructions, diagnoses, current medications, & vitals. She expressed understanding with the diagnosis and plan.

## 2019-06-03 ENCOUNTER — DOCUMENTATION ONLY (OUTPATIENT)
Dept: INTERNAL MEDICINE CLINIC | Age: 54
End: 2019-06-03

## 2019-07-03 ENCOUNTER — TELEPHONE (OUTPATIENT)
Dept: INTERNAL MEDICINE CLINIC | Age: 54
End: 2019-07-03

## 2019-07-03 ENCOUNTER — OFFICE VISIT (OUTPATIENT)
Dept: INTERNAL MEDICINE CLINIC | Age: 54
End: 2019-07-03

## 2019-07-03 ENCOUNTER — HOSPITAL ENCOUNTER (OUTPATIENT)
Dept: GENERAL RADIOLOGY | Age: 54
Discharge: HOME | End: 2019-07-03
Attending: NURSE PRACTITIONER
Payer: COMMERCIAL

## 2019-07-03 VITALS
OXYGEN SATURATION: 98 % | HEIGHT: 65 IN | RESPIRATION RATE: 14 BRPM | DIASTOLIC BLOOD PRESSURE: 70 MMHG | WEIGHT: 149 LBS | HEART RATE: 86 BPM | BODY MASS INDEX: 24.83 KG/M2 | SYSTOLIC BLOOD PRESSURE: 104 MMHG | TEMPERATURE: 98 F

## 2019-07-03 DIAGNOSIS — M35.9 CONNECTIVE TISSUE DISEASE (HCC): ICD-10-CM

## 2019-07-03 DIAGNOSIS — R06.09 DYSPNEA ON EXERTION: ICD-10-CM

## 2019-07-03 DIAGNOSIS — R00.2 PALPITATIONS: ICD-10-CM

## 2019-07-03 DIAGNOSIS — R06.09 DYSPNEA ON EXERTION: Primary | ICD-10-CM

## 2019-07-03 PROCEDURE — 71046 X-RAY EXAM CHEST 2 VIEWS: CPT

## 2019-07-03 NOTE — PROGRESS NOTES
Patient's identity verified with two patient identifiers (name and date of birth). Reviewed record in preparation for visit and have obtained necessary documentation. 1. Have you been to the ER, urgent care clinic since your last visit? Hospitalized since your last visit? No  2. Have you seen or consulted any other health care providers outside of the [address] [place] [address] since your last visit? Include any pap smears or colon screening. No    Chief Complaint   Patient presents with    Shortness of Breath     Was w/ minimal activity but now constant, x1mos. Worse w/ heat, plays tennis outside. Was in [place] last week, very difficult catching breath & trouble sleeping (couldn't lay on back). Inc HR, palpitations, denies dizziness. SOB x1mins then resolves. Takes deep breaths w/o difficulty/pain. Denies coughing. Is not in distress during rooming. Not fasting.     Health Maintenance Due   Topic Date Due    DTaP/Tdap/Td series (1 - Tdap)  Patient reports has not had. 06/03/1985    Shingrix Vaccine Age 50> (1 of 2)  Patient reports has not had. 06/03/2014       Wt Readings from Last 3 Encounters:   07/03/19 149 lb (67.6 kg)   02/27/19 146 lb 11.2 oz (66.5 kg)   11/20/18 143 lb 12.8 oz (65.2 kg)     Temp Readings from Last 3 Encounters:   07/03/19 98 °F (36.7 °C) (Oral)   02/27/19 98.2 °F (36.8 °C) (Oral)   11/20/18 98.1 °F (36.7 °C) (Oral)     BP Readings from Last 3 Encounters:   07/03/19 104/70   02/27/19 110/70   11/20/18 100/76     Pulse Readings from Last 3 Encounters:   07/03/19 86   02/27/19 90   11/20/18 84

## 2019-07-03 NOTE — TELEPHONE ENCOUNTER
Patient notified of CXR results. Consider referral to pulmonology.  Will wait for other results prior to referral.

## 2019-07-03 NOTE — PROGRESS NOTES
HISTORY OF PRESENT ILLNESS  Tracey Lemus is a 53 y.o. female. Patient reports worsening dyspnea on exertion over the past month with palpitations at times. It started while she was playing tennis, which she does frequently. She has felt some tightness in her chest at times while it is happening. Episodes tend to last a few minutes. Denies pain or radiation to jaw/arm. No dizziness. No swelling. No wheezing. Patient has history of smoking. She smoked a pack per day for 20 years. Quit 12 years ago. Symptoms became worse a few weeks ago while she was in [place]. She had one episode in [place] where she had a few minutes of shortness of breath while laying in bed. That is the only time it has happened without activity. They haven't been as bad since she returned. Prior to her trip to [place] last month, she went to [facility] [employer] [place] in March. Past Medical History:   Diagnosis Date    Autoimmune disease ([facility] [address]     Back spasm 5/25/2018    Connective tissue disease ([facility] 5/25/2018    Esophageal stricture     Menopause      Visit Vitals  /70 (BP 1 Location: Left arm, BP Patient Position: Sitting)   Pulse 86   Temp 98 °F (36.7 °C) (Oral)   Resp 14   Ht 5' 4.5\" (1.638 m)   Wt 149 lb (67.6 kg)   SpO2 98%   BMI 25.18 kg/m²       HPI    Review of Systems   Respiratory: Positive for shortness of breath. Cardiovascular: Positive for palpitations. Physical Exam   Constitutional: She is oriented to person, place, and time. She appears well-developed and well-nourished. Cardiovascular: Normal rate, regular rhythm and normal heart sounds. Pulmonary/Chest: Effort normal and breath sounds normal.   Musculoskeletal: She exhibits no edema. Neurological: She is alert and oriented to person, place, and time. Skin: Skin is warm and dry. Psychiatric: She has a normal mood and affect. ASSESSMENT and PLAN    ICD-10-CM ICD-9-CM    1.  Dyspnea on exertion R06.09 786.09 D DIMER      SED RATE (ESR)      CBC WITH AUTOMATED DIFF      ECHO STRESS      AMB POC EKG ROUTINE W/ 12 LEADS, INTER & REP      XR CHEST PA LAT   2. Connective tissue disease ([address] M35.9 710.9 ECHO STRESS   3.  Palpitations R00.2 785.1      Orders Placed This Encounter    XR CHEST PA LAT    D DIMER    SED RATE (ESR)    CBC WITH AUTOMATED DIFF    AMB POC EKG ROUTINE W/ 12 LEADS, INTER & REP    OTHER   advised to follow-up with Rheumatology  Stress echo test ordered  EKG-reviewed by Meghan Orozco and sent to Dr. Jenny Patel for review  Labs and CXR ordered  Report to ED if symptoms worsen

## 2019-07-04 LAB
BASOPHILS # BLD AUTO: 0 X10E3/UL (ref 0–0.2)
BASOPHILS NFR BLD AUTO: 1 %
D DIMER PPP FEU-MCNC: <0.2 MG/L FEU (ref 0–0.49)
EOSINOPHIL # BLD AUTO: 0.1 X10E3/UL (ref 0–0.4)
EOSINOPHIL NFR BLD AUTO: 2 %
ERYTHROCYTE [DISTWIDTH] IN BLOOD BY AUTOMATED COUNT: 12.1 % (ref 12.3–15.4)
ERYTHROCYTE [SEDIMENTATION RATE] IN BLOOD BY WESTERGREN METHOD: 7 MM/HR (ref 0–40)
HCT VFR BLD AUTO: 42.2 % (ref 34–46.6)
HGB BLD-MCNC: 14.6 G/DL (ref 11.1–15.9)
IMM GRANULOCYTES # BLD AUTO: 0 X10E3/UL (ref 0–0.1)
IMM GRANULOCYTES NFR BLD AUTO: 0 %
LYMPHOCYTES # BLD AUTO: 1.4 X10E3/UL (ref 0.7–3.1)
LYMPHOCYTES NFR BLD AUTO: 26 %
MCH RBC QN AUTO: 33.2 PG (ref 26.6–33)
MCHC RBC AUTO-ENTMCNC: 34.6 G/DL (ref 31.5–35.7)
MCV RBC AUTO: 96 FL (ref 79–97)
MONOCYTES # BLD AUTO: 0.6 X10E3/UL (ref 0.1–0.9)
MONOCYTES NFR BLD AUTO: 11 %
NEUTROPHILS # BLD AUTO: 3.2 X10E3/UL (ref 1.4–7)
NEUTROPHILS NFR BLD AUTO: 60 %
PLATELET # BLD AUTO: 257 X10E3/UL (ref 150–450)
RBC # BLD AUTO: 4.4 X10E6/UL (ref 3.77–5.28)
WBC # BLD AUTO: 5.3 X10E3/UL (ref 3.4–10.8)

## 2019-07-18 ENCOUNTER — TELEPHONE (OUTPATIENT)
Dept: INTERNAL MEDICINE CLINIC | Age: 54
End: 2019-07-18

## 2019-07-18 DIAGNOSIS — R06.09 DOE (DYSPNEA ON EXERTION): Primary | ICD-10-CM

## 2019-07-18 RX ORDER — ALBUTEROL SULFATE 90 UG/1
1 AEROSOL, METERED RESPIRATORY (INHALATION)
Qty: 1 INHALER | Refills: 0 | Status: SHIPPED | OUTPATIENT
Start: 2019-07-18 | End: 2019-08-18 | Stop reason: SDUPTHER

## 2019-07-18 NOTE — TELEPHONE ENCOUNTER
Pt called said that she is scheduled to have a stress echo test done tomorrow at [facility]. Pt said she wants to cancel it do to being expensive for her. Pt said she had a chest x-ray 2 weeks ago and they found something.  Please call pt back at [phone]

## 2019-07-18 NOTE — TELEPHONE ENCOUNTER
Returned her call re: \"next step\". CXR showed \"Evidence of Pulmonary Hyperaeration. No evidence of lobar consolidation\". There was hyperaeration of the lungs which patient understands is likely related to her hx of tobacco use. She reports she canceled her ETT due to out of pocket costs. We further discussed her exertional dyspnea during doubles tennis outdoors and then while in [place]. She now reports she played tennis yesterday [employer] without DSOUZA. She denies hx of asthma. Plan: Will try rescue inhaler   She is advised to go to ED for any protracted dyspnea. Gave her names of other potential PCPs, and encouraged her to call for appointment. She is asked to contact us in about 4-5 days with status update.

## 2019-07-18 NOTE — TELEPHONE ENCOUNTER
Patient saw Daria Kirkland on 7/3/19 for dyspnea on exertion. Had labs and CXR done.   Please advise

## 2020-09-01 ENCOUNTER — HOSPITAL ENCOUNTER (OUTPATIENT)
Dept: GENERAL RADIOLOGY | Age: 55
Discharge: HOME | End: 2020-09-01
Attending: FAMILY MEDICINE
Payer: COMMERCIAL

## 2020-09-01 DIAGNOSIS — M79.604 RIGHT LEG PAIN: ICD-10-CM

## 2020-09-01 PROCEDURE — 72100 X-RAY EXAM L-S SPINE 2/3 VWS: CPT

## 2022-03-19 PROBLEM — M62.830 BACK SPASM: Status: ACTIVE | Noted: 2018-05-25

## 2022-03-19 PROBLEM — M35.9 CONNECTIVE TISSUE DISEASE (HCC): Status: ACTIVE | Noted: 2018-05-25

## 2023-05-12 RX ORDER — CHOLECALCIFEROL (VITAMIN D3) 125 MCG
CAPSULE ORAL
COMMUNITY

## 2023-05-12 RX ORDER — TRAMADOL HYDROCHLORIDE 50 MG/1
TABLET ORAL EVERY 6 HOURS PRN
COMMUNITY

## 2023-05-12 RX ORDER — HYDROXYCHLOROQUINE SULFATE 200 MG/1
TABLET, FILM COATED ORAL 2 TIMES DAILY
COMMUNITY

## 2023-05-12 RX ORDER — ALBUTEROL SULFATE 90 UG/1
1 AEROSOL, METERED RESPIRATORY (INHALATION) EVERY 6 HOURS PRN
COMMUNITY
Start: 2019-08-19

## 2023-05-12 RX ORDER — FEXOFENADINE HCL 180 MG/1
TABLET ORAL DAILY PRN
COMMUNITY